# Patient Record
Sex: MALE | Race: WHITE | NOT HISPANIC OR LATINO | Employment: FULL TIME | URBAN - METROPOLITAN AREA
[De-identification: names, ages, dates, MRNs, and addresses within clinical notes are randomized per-mention and may not be internally consistent; named-entity substitution may affect disease eponyms.]

---

## 2017-02-15 ENCOUNTER — ALLSCRIPTS OFFICE VISIT (OUTPATIENT)
Dept: OTHER | Facility: OTHER | Age: 47
End: 2017-02-15

## 2017-02-15 DIAGNOSIS — M25.561 PAIN IN RIGHT KNEE: ICD-10-CM

## 2017-02-15 DIAGNOSIS — M25.562 PAIN IN LEFT KNEE: ICD-10-CM

## 2017-07-14 ENCOUNTER — TRANSCRIBE ORDERS (OUTPATIENT)
Dept: ADMINISTRATIVE | Facility: HOSPITAL | Age: 47
End: 2017-07-14

## 2017-07-14 ENCOUNTER — HOSPITAL ENCOUNTER (OUTPATIENT)
Dept: RADIOLOGY | Facility: HOSPITAL | Age: 47
Discharge: HOME | End: 2017-07-14
Attending: FAMILY MEDICINE
Payer: COMMERCIAL

## 2017-07-14 DIAGNOSIS — M25.561 ACUTE PAIN OF RIGHT KNEE: Primary | ICD-10-CM

## 2017-07-14 DIAGNOSIS — M25.562 ACUTE PAIN OF LEFT KNEE: ICD-10-CM

## 2017-07-14 PROCEDURE — 73562 X-RAY EXAM OF KNEE 3: CPT

## 2017-07-18 ENCOUNTER — GENERIC CONVERSION - ENCOUNTER (OUTPATIENT)
Dept: OTHER | Facility: OTHER | Age: 47
End: 2017-07-18

## 2017-08-16 ENCOUNTER — GENERIC CONVERSION - ENCOUNTER (OUTPATIENT)
Dept: OTHER | Facility: OTHER | Age: 47
End: 2017-08-16

## 2017-08-16 LAB
BASOPHILS # BLD AUTO: 0 X10E3/UL (ref 0–0.2)
BASOPHILS # BLD AUTO: 1 %
CHOLEST SERPL-MCNC: 177 MG/DL (ref 100–199)
CHOLEST/HDLC SERPL: 3.4 RATIO UNITS (ref 0–5)
DEPRECATED RDW RBC AUTO: 13.9 % (ref 12.3–15.4)
EOSINOPHIL # BLD AUTO: 0.4 X10E3/UL (ref 0–0.4)
EOSINOPHIL # BLD AUTO: 4 %
HCT VFR BLD AUTO: 42.2 % (ref 37.5–51)
HDLC SERPL-MCNC: 52 MG/DL
HGB BLD-MCNC: 14.4 G/DL (ref 12.6–17.7)
IMM.GRANULOCYTES (CD4/8) (HISTORICAL): 0 %
IMM.GRANULOCYTES (CD4/8) (HISTORICAL): 0 X10E3/UL (ref 0–0.1)
LDLC SERPL CALC-MCNC: 102 MG/DL (ref 0–99)
LYMPHOCYTES # BLD AUTO: 1.9 X10E3/UL (ref 0.7–3.1)
LYMPHOCYTES # BLD AUTO: 21 %
MCH RBC QN AUTO: 30.7 PG (ref 26.6–33)
MCHC RBC AUTO-ENTMCNC: 34.1 G/DL (ref 31.5–35.7)
MCV RBC AUTO: 90 FL (ref 79–97)
MONOCYTES # BLD AUTO: 0.7 X10E3/UL (ref 0.1–0.9)
MONOCYTES (HISTORICAL): 7 %
NEUTROPHILS # BLD AUTO: 5.9 X10E3/UL (ref 1.4–7)
NEUTROPHILS # BLD AUTO: 67 %
PLATELET # BLD AUTO: 280 X10E3/UL (ref 150–379)
RBC (HISTORICAL): 4.69 X10E6/UL (ref 4.14–5.8)
TRIGL SERPL-MCNC: 117 MG/DL (ref 0–149)
VLDLC SERPL CALC-MCNC: 23 MG/DL (ref 5–40)
WBC # BLD AUTO: 8.8 X10E3/UL (ref 3.4–10.8)

## 2017-08-17 ENCOUNTER — GENERIC CONVERSION - ENCOUNTER (OUTPATIENT)
Dept: OTHER | Facility: OTHER | Age: 47
End: 2017-08-17

## 2017-08-17 LAB
A/G RATIO (HISTORICAL): 1.5 (ref 1.2–2.2)
ALBUMIN SERPL BCP-MCNC: 4.6 G/DL (ref 3.5–5.5)
ALP SERPL-CCNC: 80 IU/L (ref 39–117)
ALT SERPL W P-5'-P-CCNC: 37 IU/L (ref 0–44)
AST SERPL W P-5'-P-CCNC: 24 IU/L (ref 0–40)
BILIRUB SERPL-MCNC: 0.5 MG/DL (ref 0–1.2)
BUN SERPL-MCNC: 14 MG/DL (ref 6–24)
BUN/CREA RATIO (HISTORICAL): 15 (ref 9–20)
CALCIUM SERPL-MCNC: 9.7 MG/DL (ref 8.7–10.2)
CHLORIDE SERPL-SCNC: 100 MMOL/L (ref 96–106)
CO2 SERPL-SCNC: 21 MMOL/L (ref 18–29)
CREAT SERPL-MCNC: 0.91 MG/DL (ref 0.76–1.27)
EGFR AFRICAN AMERICAN (HISTORICAL): 116 ML/MIN/1.73
EGFR-AMERICAN CALC (HISTORICAL): 100 ML/MIN/1.73
GLUCOSE SERPL-MCNC: 112 MG/DL (ref 65–99)
INTERPRETATION (HISTORICAL): NORMAL
POTASSIUM SERPL-SCNC: 4.3 MMOL/L (ref 3.5–5.2)
PROSTATE SPECIFIC ANTIGEN (HISTORICAL): <0.1 NG/ML (ref 0–4)
SODIUM SERPL-SCNC: 139 MMOL/L (ref 134–144)
TOT. GLOBULIN, SERUM (HISTORICAL): 3 G/DL (ref 1.5–4.5)
TOTAL PROTEIN (HISTORICAL): 7.6 G/DL (ref 6–8.5)

## 2017-08-24 ENCOUNTER — ALLSCRIPTS OFFICE VISIT (OUTPATIENT)
Dept: OTHER | Facility: OTHER | Age: 47
End: 2017-08-24

## 2017-12-01 ENCOUNTER — ALLSCRIPTS OFFICE VISIT (OUTPATIENT)
Dept: OTHER | Facility: OTHER | Age: 47
End: 2017-12-01

## 2017-12-04 LAB
A/G RATIO (HISTORICAL): 1.6 (ref 1.2–2.2)
ALBUMIN SERPL BCP-MCNC: 4.4 G/DL (ref 3.5–5.5)
ALP SERPL-CCNC: 81 IU/L (ref 39–117)
ALT SERPL W P-5'-P-CCNC: 88 IU/L (ref 0–44)
AST SERPL W P-5'-P-CCNC: 58 IU/L (ref 0–40)
BASOPHILS # BLD AUTO: 0 X10E3/UL (ref 0–0.2)
BASOPHILS # BLD AUTO: 1 %
BILIRUB SERPL-MCNC: 0.3 MG/DL (ref 0–1.2)
BUN SERPL-MCNC: 17 MG/DL (ref 6–24)
BUN/CREA RATIO (HISTORICAL): 21 (ref 9–20)
CALCIUM SERPL-MCNC: 9.3 MG/DL (ref 8.7–10.2)
CHLORIDE SERPL-SCNC: 103 MMOL/L (ref 96–106)
CO2 SERPL-SCNC: 21 MMOL/L (ref 18–29)
CREAT SERPL-MCNC: 0.82 MG/DL (ref 0.76–1.27)
DEPRECATED RDW RBC AUTO: 14 % (ref 12.3–15.4)
EGFR AFRICAN AMERICAN (HISTORICAL): 122 ML/MIN/1.73
EGFR-AMERICAN CALC (HISTORICAL): 105 ML/MIN/1.73
EOSINOPHIL # BLD AUTO: 0.2 X10E3/UL (ref 0–0.4)
EOSINOPHIL # BLD AUTO: 2 %
GLUCOSE SERPL-MCNC: 98 MG/DL (ref 65–99)
HCT VFR BLD AUTO: 39.4 % (ref 37.5–51)
HGB BLD-MCNC: 13.4 G/DL (ref 13–17.7)
IMM.GRANULOCYTES (CD4/8) (HISTORICAL): 0.1 X10E3/UL (ref 0–0.1)
IMM.GRANULOCYTES (CD4/8) (HISTORICAL): 1 %
LYMPHOCYTES # BLD AUTO: 1.5 X10E3/UL (ref 0.7–3.1)
LYMPHOCYTES # BLD AUTO: 20 %
MCH RBC QN AUTO: 30.3 PG (ref 26.6–33)
MCHC RBC AUTO-ENTMCNC: 34 G/DL (ref 31.5–35.7)
MCV RBC AUTO: 89 FL (ref 79–97)
MONOCYTES # BLD AUTO: 0.5 X10E3/UL (ref 0.1–0.9)
MONOCYTES (HISTORICAL): 7 %
NEUTROPHILS # BLD AUTO: 5.1 X10E3/UL (ref 1.4–7)
NEUTROPHILS # BLD AUTO: 69 %
PLATELET # BLD AUTO: 276 X10E3/UL (ref 150–379)
POTASSIUM SERPL-SCNC: 4.2 MMOL/L (ref 3.5–5.2)
RBC (HISTORICAL): 4.42 X10E6/UL (ref 4.14–5.8)
SODIUM SERPL-SCNC: 141 MMOL/L (ref 134–144)
TOT. GLOBULIN, SERUM (HISTORICAL): 2.8 G/DL (ref 1.5–4.5)
TOTAL PROTEIN (HISTORICAL): 7.2 G/DL (ref 6–8.5)
WBC # BLD AUTO: 7.4 X10E3/UL (ref 3.4–10.8)

## 2017-12-05 ENCOUNTER — GENERIC CONVERSION - ENCOUNTER (OUTPATIENT)
Dept: OTHER | Facility: OTHER | Age: 47
End: 2017-12-05

## 2017-12-05 LAB — TSH SERPL DL<=0.05 MIU/L-ACNC: 1 UIU/ML (ref 0.45–4.5)

## 2017-12-05 NOTE — PROGRESS NOTES
Assessment    1  Syncope (780 2) (R55)   2  Benign essential hypertension (401 1) (I10)   3  Adenocarcinoma of prostate (185) (C61)   4  Abnormal glucose (790 29) (R73 09)   5  Leukocytosis (288 60) (D72 829)   6  Dehydration (276 51) (E86 0)   7  BMI 50 0-59 9, adult (V85 43) (Z68 43)   8  Obesity, morbid, BMI 50 or higher (278 01) (E66 01)   9  Never a smoker    Plan  Abnormal glucose, Adenocarcinoma of prostate, Benign essential hypertension, BMI  50 0-59 9, adult, Dehydration, Leukocytosis, SocHx: Never a smoker, Obesity, morbid,  BMI 50 or higher, Syncope    · (1) CBC/PLT/DIFF; Status:Active; Requested for:01Dec2017;    · (1) COMPREHENSIVE METABOLIC PANEL; Status:Active; Requested for:01Dec2017;    · (1) TSH; Status:Active; Requested for:01Dec2017;   Benign essential hypertension    · From  Bystolic 10 MG Oral Tablet take one tablet by mouth every day To  Bystolic 10 MG Oral Tablet TAKE 1/2 TABLET BY MOUTH EVERY DAY  Obesity, morbid, BMI 50 or higher    · Avoid alcoholic beverages ; Status:Complete;   Done: 27OYS7673   · Begin a limited exercise program ; Status:Complete;   Done: 48XXQ1259   · Begin or continue regular aerobic exercise  Gradually work up to at least 3 sessions of  30 minutes of exercise a week ; Status:Complete;   Done: 61TJL0308   · Eat a low fat and low cholesterol diet ; Status:Complete;   Done: 49NSL3548   · Keep a diary of when and what you eat ; Status:Complete;   Done: 26MZO0946   · Shared Decision Making Aid given; Status:Complete;   Done: 43BXV7881   · Some eating tips that can help you lose weight ; Status:Complete;   Done: 63LPQ2432   · Stretch and warm up your muscles during the first 10 minutes , then cool down your  muscles for the last 10 minutes of exercise ; Status:Complete;   Done: 51LZP4684   · There are many exercise options for seniors ; Status:Complete;   Done: 74AHD9981   · We encourage all of our patients to exercise regularly    30 minutes of exercise or physical  activity five or more days a week is recommended for children and adults ;  Status:Complete;   Done: 39AVC1145   · We recommend that you bring your body mass index down to 26 ; Status:Complete;    Done: 89GEL4477   · We recommend that you change your eating habits slowly ; Status:Complete;   Done:  41JGZ0436   · We recommend you modify your diet to achieve and maintain a healthy weight  Being  overweight may increase your risk for developing health problems such as diabetes,  heart disease, and cancer  Avoid high fat foods and eat a balanced diet rich  in fruits and vegetables  The combination of a reduced-calorie diet and increased  physical activity is recommended    Please let us know if you would like to  learn more about your nutrition and calorie needs, and additional options including  weight loss programs that can help you achieve your goals ; Status:Complete;   Done:  48HLX3726   · We want you to follow the Therapeutic Lifestyle Changes (TLC) diet ; Status:Complete;    Done: 63TXZ4202   · Call [phone] if: You are considering suicide ; Status:Complete;   Done:  43EEU7944   · Call [phone] if: You are having difficulty sleeping (insomnia) ; Status:Complete;    Done: 90DUX0408   · Call [phone] if: You are urinating too frequently ; Status:Complete;   Done:  68PKJ1285   · Call [phone] if: You feel thirsty most of the time ; Status:Complete;   Done:  62CXX9067   · Call [phone] if: You feel your heart is beating very fast or skipping beats ;  Status:Complete;   Done: 41AHI8434   · Call [phone] if: You have feelings of extreme sadness and feelings of  hopelessness ; Status:Complete;   Done: 75GMC9230   · Call [phone] if: You have pain in your abdomen ; Status:Complete;   Done:  67QDH2738   · Call [phone] if: You have symptoms of sleep apnea ; Status:Complete;   Done:  49YAN3610   · Call 911 if: You have sudden or severe chest pain with shortness of breath, rapid  breathing, or cough ; Status:Complete;   Done: 29EDQ0279   · Seek Immediate Medical Attention if: You experience a new kind of chest pain (angina) or  pressure ; Status:Complete;   Done: 15SDC5643    Discussion/Summary    Pt advised not to drive his car with exhaust fumes    pt has an appt with Dr Kimberlee Branch coming up for his prostate    pt looks good to me today, will [facility] but that tends to be hard  will get labs to follow up abnormalitis noted by pat and will follow    will cut bact the bystolic in half  Chief Complaint  pt present for hospital follow up for passing out and vomiting  ac/cma      History of Present Illness  Pt states he is here for a hospital follow up    pt states he was at work went to the bathroom twice first time he went it was solid second time it was loose  had stomach cramps, then he started feeling funny and he went back to his office and he passed out  pt states he woke up and the emt's got there and he felt sick and he vomited two liters of fluid - as per pt  Pt states he tyhinks he may have had food poisening as he ate sketchy things   Pt told the emt to go away - an hr later he felt sick vomited again and he went to hospital - this was two days ago  this was seen in the ed and let go that night    pt states his boss is an MD and he witnessed the whole thing  pt states he has witched to a ketogenic diet    Pt did not loose bowel control    pt does not have his discharge paperwork  i do not have an ed report    pt states he had an ekg a chest x ray - was apparentl;y ok  states his BP was ok  was told his WBC count was slightly high  pt states he was told he was dehydrated  pt drinks three liters of water a day    pt states he finds that he fasts and he wont eat from the am till dinner    pt states he has had exhaust fumes come in the car curt a month or so    pt states he does take his colchicine on occasion for gout but does not get bad attacks      Review of Systems    Constitutional: No fever or chills, feels well, no tiredness, no recent weight gain or weight loss  Eyes: No complaints of eye pain, no red eyes, no discharge from eyes, no itchy eyes  ENT: no complaints of earache, no hearing loss, no nosebleeds, no nasal discharge, no sore throat, no hoarseness  Cardiovascular: No complaints of slow heart rate, no fast heart rate, no chest pain, no palpitations, no leg claudication, no lower extremity  Respiratory: No complaints of shortness of breath, no wheezing, no cough, no SOB on exertion, no orthopnea or PND  Gastrointestinal: No complaints of abdominal pain, no constipation, no nausea or vomiting, no diarrhea or bloody stools  Genitourinary: No complaints of dysuria, no incontinence, no hesitancy, no nocturia, no genital lesion, no testicular pain  Musculoskeletal: No complaints of arthralgia, no myalgias, no joint swelling or stiffness, no limb pain or swelling  Integumentary: No complaints of skin rash or skin lesions, no itching, no skin wound, no dry skin  Neurological: No compliants of headache, no confusion, no convulsions, no numbness or tingling, no dizziness or fainting, no limb weakness, no difficulty walking  Psychiatric: Is not suicidal, no sleep disturbances, no anxiety or depression, no change in personality, no emotional problems  Endocrine: No complaints of proptosis, no hot flashes, no muscle weakness, no erectile dysfunction, no deepening of the voice, no feelings of weakness  Active Problems    1  Abnormal glucose (790 29) (R73 09)   2  Adenocarcinoma of prostate (185) (C61)   3  Benign essential hypertension (401 1) (I10)   4  Depression screening (V79 0) (Z13 89)   5  Encounter for prostate cancer screening (V76 44) (Z12 5)   6  Exposure to influenza (V01 79) (Z20 828)   7  Gout (274 9) (M10 9)   8  Irregular sleep-wake rhythm, 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)   9   Obstructive sleep apnea (327 23) (G47 33)   10  Osteoarthritis of both knees (715 96) (M17 0)   11  Prostate cancer (185) (C61)   12  Varicose Veins Of Lower Extremities (454 9)   13  Venous insufficiency (chronic) (peripheral) (459 81) (I87 2)    Past Medical History    1  History of Abnormal electrocardiogram (794 31) (R94 31)   2  History of Acute upper respiratory infection (465 9) (J06 9)   3  History of Arthropathy (716 90) (M12 9)   4  History of BMI 50 0-59 9, adult (V85 43) (Z68 43)   5  History of BMI 60 0-69 9, adult (V85 44) (Z68 44)   6  History of Cervicalgia (723 1) (M54 2)   7  History of Difficulty breathing (786 09) (R06 89)   8  History of Elbow pain, unspecified laterality (719 42) (M25 529)   9  History of Gout (274 9) (M10 9)   10  History of acute bronchitis (V12 69) (Z87 09)   11  History of hemorrhoids (V13 89) (Z87 19)   12  History of hypertension (V12 59) (Z86 79)   13  History of low back pain (V13 59) (Z87 39)   14  History of sleep apnea (V13 89) (Z86 69)   15  History of tachycardia (V13 89) (Z87 898)   16  History of Impaired fasting glucose (790 21) (R73 01)   17  History of Joint pain, knee (719 46) (M25 569)   18  History of Pneumonia (V12 61)   19  History of Varicose Veins Of Lower Extremities (454 9)    The active problems and past medical history were reviewed and updated today  Surgical History    1  History of Prostatectomy Radical   2  History of Shoulder Surgery    The surgical history was reviewed and updated today  Family History  Mother    1  No pertinent family history  Family History    2  Family history of Coronary Artery Disease (V17 49)   3  Family history of Esophageal Cancer (V16 0)   4  Family history of Heart Disease (V17 49)    The family history was reviewed and updated today  Social History    · Never a smoker  The social history was reviewed and updated today  Current Meds   1  Bystolic 10 MG Oral Tablet; take one tablet by mouth every day;    Therapy: 51WOW0852 to (Last Rx:09Oct2017)  Requested for: 08SYY0759 Ordered   2  Colcrys 0 6 MG Oral Tablet; TAKE TWO TABLETS BY MOUTH FOR 1 DOSE, THEN TAKE 1   TABLET 1 HOUR LATERAS DIRECTED; Therapy: 23POI7326 to (Last Rx:25Oct2017)  Requested for: 25Oct2017 Ordered   3  Olmesartan Medoxomil 40 MG Oral Tablet; take one tablet by mouth every day; Therapy: 71MCS7014 to (77 873 135)  Requested for: 89Usm9555; Last   Rx:05Apr2017 Ordered    The medication list was reviewed and updated today  Allergies    1  No Known Drug Allergies    Vitals  Vital Signs    Recorded: 91NCY3901 08:53AM Recorded: 22ONJ2025 08:39AM   Temperature  95 6 F   Heart Rate  88   Respiration  20   Systolic  228   Diastolic  74   Height  6 ft    Weight 417 lb     BMI Calculated 56 56    BSA Calculated 2 91    Weight Unobtainable  Yes     Physical Exam    Constitutional   General appearance: No acute distress, well appearing and well nourished  Eyes   Conjunctiva and lids: No swelling, erythema, or discharge  Pupils and irises: Equal, round and reactive to light  Ears, Nose, Mouth, and Throat   External inspection of ears and nose: Normal     Otoscopic examination: Tympanic membrance translucent with normal light reflex  Canals patent without erythema  Oropharynx: Normal with no erythema, edema, exudate or lesions  Pulmonary   Auscultation of lungs: Clear to auscultation, equal breath sounds bilaterally, no wheezes, no rales, no rhonci  Cardiovascular   Auscultation of heart: Normal rate and rhythm, normal S1 and S2, without murmurs  Abdomen   Abdomen: Non-tender, no masses  Lymphatic   Palpation of lymph nodes in neck: No lymphadenopathy  Musculoskeletal   Gait and station: Normal     Digits and nails: Normal without clubbing or cyanosis  Skin   Skin and subcutaneous tissue: Normal without rashes or lesions  Neurologic   Cranial nerves: Cranial nerves 2-12 intact      Psychiatric   Orientation to person, place and time: Normal          Signatures   Electronically signed by : Anjel Walker DO; Dec  1 2017  9:10AM EST                       (Author)

## 2018-01-13 VITALS
BODY MASS INDEX: 42.66 KG/M2 | DIASTOLIC BLOOD PRESSURE: 60 MMHG | RESPIRATION RATE: 20 BRPM | WEIGHT: 315 LBS | HEIGHT: 72 IN | SYSTOLIC BLOOD PRESSURE: 126 MMHG | HEART RATE: 98 BPM | TEMPERATURE: 96.3 F

## 2018-01-13 VITALS
SYSTOLIC BLOOD PRESSURE: 140 MMHG | BODY MASS INDEX: 42.66 KG/M2 | WEIGHT: 315 LBS | DIASTOLIC BLOOD PRESSURE: 78 MMHG | HEART RATE: 88 BPM | RESPIRATION RATE: 20 BRPM | TEMPERATURE: 96.5 F | HEIGHT: 72 IN

## 2018-01-13 NOTE — RESULT NOTES
Discussion/Summary   will discuss labs at follow up appt     Verified Results  (1) CBC/PLT/DIFF 68YSO4819 12:23PM Veryl Deacon     Test Name Result Flag Reference   WBC 8 8 x10E3/uL  3 4-10 8   RBC 4 69 x10E6/uL  4 14-5 80   Hemoglobin 14 4 g/dL  12 6-17 7   Hematocrit 42 2 %  37 5-51 0   MCV 90 fL  79-97   MCH 30 7 pg  26 6-33 0   MCHC 34 1 g/dL  31 5-35 7   RDW 13 9 %  12 3-15 4   Platelets 161 M16N0/KK  150-379   Neutrophils 67 %     Lymphs 21 %     Monocytes 7 %     Eos 4 %     Basos 1 %     Neutrophils (Absolute) 5 9 x10E3/uL  1 4-7 0   Lymphs (Absolute) 1 9 x10E3/uL  0 7-3 1   Monocytes(Absolute) 0 7 x10E3/uL  0 1-0 9   Eos (Absolute) 0 4 x10E3/uL  0 0-0 4   Baso (Absolute) 0 0 x10E3/uL  0 0-0 2   Immature Granulocytes 0 %     Immature Grans (Abs) 0 0 x10E3/uL  0 0-0 1     (1) COMPREHENSIVE METABOLIC PANEL 75MJS6443 80:84TH Veryl Deacon     Test Name Result Flag Reference   Glucose, Serum 112 mg/dL H 65-99   BUN 14 mg/dL  6-24   Creatinine, Serum 0 91 mg/dL  0 76-1 27   BUN/Creatinine Ratio 15  9-20   Sodium, Serum 139 mmol/L  134-144   Potassium, Serum 4 3 mmol/L  3 5-5 2   Chloride, Serum 100 mmol/L     Carbon Dioxide, Total 21 mmol/L  18-29   Calcium, Serum 9 7 mg/dL  8 7-10 2   Protein, Total, Serum 7 6 g/dL  6 0-8 5   Albumin, Serum 4 6 g/dL  3 5-5 5   Globulin, Total 3 0 g/dL  1 5-4 5   A/G Ratio 1 5  1 2-2 2   Bilirubin, Total 0 5 mg/dL  0 0-1 2   Alkaline Phosphatase, S 80 IU/L     AST (SGOT) 24 IU/L  0-40   ALT (SGPT) 37 IU/L  0-44   eGFR If NonAfricn Am 100 mL/min/1 73  >59   eGFR If Africn Am 116 mL/min/1 73  >59     (1) LIPID PANEL, FASTING 36Tgm8416 12:23PM Veryl Deacon     Test Name Result Flag Reference   Cholesterol, Total 177 mg/dL  100-199   Triglycerides 117 mg/dL  0-149   HDL Cholesterol 52 mg/dL  >39   VLDL Cholesterol Christian 23 mg/dL  5-40   LDL Cholesterol Calc 102 mg/dL H 0-99   T  Chol/HDL Ratio 3 4 ratio units  0 0-5 0   T   Chol/HDL Ratio Men  Women                                               1/2 Avg  Risk  3 4    3 3                                                   Avg Risk  5 0    4 4                                                2X Avg  Risk  9 6    7 1                                                3X Avg  Risk 23 4   11 0     (1) PSA (SCREEN) (Dx V76 44 Screen for Prostate Cancer) 63VIN4627 12:23PM Ramon Lam     Test Name Result Flag Reference   Prostate Specific Ag, Serum <0 1 ng/mL  0 0-4 0   Roche ECLIA methodology  According to the American U[employer] Association, Serum PSA should  decrease and remain at undetectable levels after radical  prostatectomy  The AUA defines biochemical recurrence as an initial  PSA value 0 2 ng/mL or greater followed by a subsequent confirmatory  PSA value 0 2 ng/mL or greater  Values obtained with different assay methods or kits cannot be used  interchangeably  Results cannot be interpreted as absolute evidence  of the presence or absence of malignant disease  [facility]) Cardiovascular Risk Assessment 41Cmi9019 12:23PM Juan Lopez courtesy copy of this report has been sent to  Binu Anaya MD      Test Name Result Flag Reference   Interpretation Note     Supplement report is available  PDF Image

## 2018-01-16 NOTE — RESULT NOTES
Discussion/Summary   x rays show B/L osteoarthritis  If pain continues eval by ortho would be useful     Verified Results  * XR KNEE 3 VW LEFT NON INJURY 39IUT1421 12:00AM Leim Lyndsey     Test Name Result Flag Reference   XR KNEE 3 VW LEFT (Report)     LEFT KNEE     INDICATION: Left knee pain  COMPARISON: None     VIEWS: 3     IMAGES: 3     FINDINGS:     There is no acute fracture or dislocation  There is a small joint effusion  There is moderate osteoarthritis of the medial compartment with joint space loss and osteophytosis  Spurring of the patella is also noted  No lytic or blastic lesions are seen  Soft tissues are unremarkable  IMPRESSION:       1  No acute osseous abnormality  2  Degenerative changes as described  Workstation performed: [id number]     Signed by:   Pauly Monroe MD   7/18/17     * XR KNEE 3 VW RIGHT NON INJURY 24YKN4317 12:00AM Leim Lyndsey     Test Name Result Flag Reference   XR KNEE 3 VW RIGHT (Report)     RIGHT KNEE     INDICATION: Right knee pain  COMPARISON: None     VIEWS: 3     IMAGES: 3     FINDINGS:     There is no acute fracture or dislocation  There is no joint effusion  Tricompartmental osteoarthritis is most severe in the medial compartment where there is joint space loss and mild osteophytosis  No lytic or blastic lesions are seen  Soft tissues are unremarkable  IMPRESSION:       1  No acute osseous abnormality  2  Degenerative changes as described         Workstation performed: [id number]     Signed by:   Pauly Monroe MD   7/18/17

## 2018-01-19 LAB
A/G RATIO (HISTORICAL): 1.5 (ref 1.2–2.2)
ALBUMIN SERPL BCP-MCNC: 4.5 G/DL (ref 3.5–5.5)
ALP SERPL-CCNC: 81 IU/L (ref 39–117)
ALT SERPL W P-5'-P-CCNC: 34 IU/L (ref 0–44)
AST SERPL W P-5'-P-CCNC: 23 IU/L (ref 0–40)
BILIRUB SERPL-MCNC: 0.3 MG/DL (ref 0–1.2)
BUN SERPL-MCNC: 32 MG/DL (ref 6–24)
BUN/CREA RATIO (HISTORICAL): 32 (ref 9–20)
CALCIUM SERPL-MCNC: 9.5 MG/DL (ref 8.7–10.2)
CHLORIDE SERPL-SCNC: 104 MMOL/L (ref 96–106)
CO2 SERPL-SCNC: 19 MMOL/L (ref 18–29)
CREAT SERPL-MCNC: 1 MG/DL (ref 0.76–1.27)
EGFR AFRICAN AMERICAN (HISTORICAL): 103 ML/MIN/1.73
EGFR-AMERICAN CALC (HISTORICAL): 89 ML/MIN/1.73
GLUCOSE SERPL-MCNC: 128 MG/DL (ref 65–99)
POTASSIUM SERPL-SCNC: 4.6 MMOL/L (ref 3.5–5.2)
SODIUM SERPL-SCNC: 141 MMOL/L (ref 134–144)
TOT. GLOBULIN, SERUM (HISTORICAL): 3.1 G/DL (ref 1.5–4.5)
TOTAL PROTEIN (HISTORICAL): 7.6 G/DL (ref 6–8.5)

## 2018-01-22 ENCOUNTER — GENERIC CONVERSION - ENCOUNTER (OUTPATIENT)
Dept: OTHER | Facility: OTHER | Age: 48
End: 2018-01-22

## 2018-01-23 VITALS
HEIGHT: 72 IN | BODY MASS INDEX: 42.66 KG/M2 | DIASTOLIC BLOOD PRESSURE: 74 MMHG | RESPIRATION RATE: 20 BRPM | SYSTOLIC BLOOD PRESSURE: 124 MMHG | WEIGHT: 315 LBS | HEART RATE: 88 BPM | TEMPERATURE: 95.6 F

## 2018-01-23 NOTE — RESULT NOTES
Discussion/Summary   some elevation in liver function studies should repeat in a month     Verified Results  (1) CBC/PLT/DIFF 60LLN4741 10:12AM [facility]     Test Name Result Flag Reference   WBC 7 4 x10E3/uL  3 4-10 8   RBC 4 42 x10E6/uL  4 14-5 80   Hemoglobin 13 4 g/dL  13 0-17 7   **Please note reference interval change**   Hematocrit 39 4 %  37 5-51 0   MCV 89 fL  79-97   MCH 30 3 pg  26 6-33 0   MCHC 34 0 g/dL  31 5-35 7   RDW 14 0 %  12 3-15 4   Platelets 725 G21T1/AC  150-379   Neutrophils 69 %  Not Estab  Lymphs 20 %  Not Estab  Monocytes 7 %  Not Estab  Eos 2 %  Not Estab  Basos 1 %  Not Estab  Neutrophils (Absolute) 5 1 x10E3/uL  1 4-7 0   Lymphs (Absolute) 1 5 x10E3/uL  0 7-3 1   Monocytes(Absolute) 0 5 x10E3/uL  0 1-0 9   Eos (Absolute) 0 2 x10E3/uL  0 0-0 4   Baso (Absolute) 0 0 x10E3/uL  0 0-0 2   Immature Granulocytes 1 %  Not Estab  Immature Grans (Abs) 0 1 x10E3/uL  0 0-0 1     (1) COMPREHENSIVE METABOLIC PANEL 30UWY0101 79:22LB [facility]     Test Name Result Flag Reference   Glucose, Serum 98 mg/dL  65-99   BUN 17 mg/dL  6-24   Creatinine, Serum 0 82 mg/dL  0 76-1 27   BUN/Creatinine Ratio 21 H 9-20   Sodium, Serum 141 mmol/L  134-144   Potassium, Serum 4 2 mmol/L  3 5-5 2   Chloride, Serum 103 mmol/L     Carbon Dioxide, Total 21 mmol/L  18-29   Calcium, Serum 9 3 mg/dL  8 7-10 2   Protein, Total, Serum 7 2 g/dL  6 0-8 5   Albumin, Serum 4 4 g/dL  3 5-5 5   Globulin, Total 2 8 g/dL  1 5-4 5   A/G Ratio 1 6  1 2-2 2   Bilirubin, Total 0 3 mg/dL  0 0-1 2   Alkaline Phosphatase, S 81 IU/L     AST (SGOT) 58 IU/L H 0-40   ALT (SGPT) 88 IU/L H 0-44   eGFR If NonAfricn Am 105 mL/min/1 73  >59   eGFR If Africn Am 122 mL/min/1 73  >59     (1) TSH [id number] 10:12AM Bela Colunga     Test Name Result Flag Reference   TSH 0 998 uIU/mL  0 450-4 500       Plan  Elevated liver enzymes    · (1) COMPREHENSIVE METABOLIC PANEL; Status:Active;  Requested for:75Txu5424;

## 2018-01-24 NOTE — RESULT NOTES
Discussion/Summary   limit carbs increase sugars     Verified Results  (1) COMPREHENSIVE METABOLIC PANEL 15JGI2247 20:92JW Joelle Stevesnon     Test Name Result Flag Reference   Glucose, Serum 128 mg/dL H 65-99   BUN 32 mg/dL H 6-24   Creatinine, Serum 1 00 mg/dL  0 76-1 27   BUN/Creatinine Ratio 32 H 9-20   Sodium, Serum 141 mmol/L  134-144   Potassium, Serum 4 6 mmol/L  3 5-5 2   Chloride, Serum 104 mmol/L     Carbon Dioxide, Total 19 mmol/L  18-29   Calcium, Serum 9 5 mg/dL  8 7-10 2   Protein, Total, Serum 7 6 g/dL  6 0-8 5   Albumin, Serum 4 5 g/dL  3 5-5 5   Globulin, Total 3 1 g/dL  1 5-4 5   A/G Ratio 1 5  1 2-2 2   Bilirubin, Total 0 3 mg/dL  0 0-1 2   Alkaline Phosphatase, S 81 IU/L     AST (SGOT) 23 IU/L  0-40   ALT (SGPT) 34 IU/L  0-44   eGFR If NonAfricn Am 89 mL/min/1 73  >59   eGFR If Africn Am 103 mL/min/1 73  >59

## 2018-02-01 ENCOUNTER — TELEPHONE (OUTPATIENT)
Dept: FAMILY MEDICINE CLINIC | Facility: CLINIC | Age: 48
End: 2018-02-01

## 2018-02-02 NOTE — TELEPHONE ENCOUNTER
Pt left a message on result hotline  He received his labs back with the results  (I had to look in [employer] for them) He has questions about the annotation  What you wrote is "limit carbs and increase sugars" pt wants to know if you meant increase salt because his sugars came back high  I put the lab work in your folder to review  Pt would like a call back   Jonnathan Cowan [place]

## 2018-04-09 DIAGNOSIS — I10 BENIGN ESSENTIAL HYPERTENSION: Primary | ICD-10-CM

## 2018-04-09 PROBLEM — M17.0 OSTEOARTHRITIS OF BOTH KNEES: Status: ACTIVE | Noted: 2017-08-24

## 2018-04-09 PROBLEM — R74.8 ELEVATED LIVER ENZYMES: Status: ACTIVE | Noted: 2017-12-05

## 2018-04-09 RX ORDER — OLMESARTAN MEDOXOMIL 40 MG/1
1 TABLET ORAL DAILY
COMMUNITY
Start: 2013-03-25 | End: 2018-04-09 | Stop reason: SDUPTHER

## 2018-04-09 RX ORDER — COLCHICINE 0.6 MG/1
0.6 TABLET ORAL AS NEEDED
COMMUNITY
Start: 2018-02-03 | End: 2018-12-11 | Stop reason: SDUPTHER

## 2018-04-09 RX ORDER — OLMESARTAN MEDOXOMIL 40 MG/1
40 TABLET ORAL DAILY
Qty: 90 TABLET | Refills: 1 | Status: SHIPPED | OUTPATIENT
Start: 2018-04-09 | End: 2018-10-02 | Stop reason: SDUPTHER

## 2018-04-09 RX ORDER — NEBIVOLOL 10 MG/1
5 TABLET ORAL DAILY
Qty: 90 TABLET | Refills: 1 | Status: SHIPPED | OUTPATIENT
Start: 2018-04-09 | End: 2018-09-21

## 2018-04-09 RX ORDER — NEBIVOLOL 10 MG/1
0.5 TABLET ORAL DAILY
COMMUNITY
Start: 2012-03-19 | End: 2018-04-09 | Stop reason: SDUPTHER

## 2018-07-10 DIAGNOSIS — M10.9 GOUT, UNSPECIFIED CAUSE, UNSPECIFIED CHRONICITY, UNSPECIFIED SITE: Primary | ICD-10-CM

## 2018-07-10 RX ORDER — ALLOPURINOL 100 MG/1
100 TABLET ORAL DAILY
Qty: 90 TABLET | Refills: 1 | Status: SHIPPED | OUTPATIENT
Start: 2018-07-10 | End: 2018-09-21

## 2018-09-16 ENCOUNTER — HOSPITAL ENCOUNTER (EMERGENCY)
Facility: HOSPITAL | Age: 48
Discharge: HOME | End: 2018-09-16
Attending: EMERGENCY MEDICINE | Admitting: EMERGENCY MEDICINE
Payer: COMMERCIAL

## 2018-09-16 ENCOUNTER — APPOINTMENT (EMERGENCY)
Dept: RADIOLOGY | Facility: HOSPITAL | Age: 48
End: 2018-09-16
Payer: COMMERCIAL

## 2018-09-16 VITALS
HEART RATE: 80 BPM | OXYGEN SATURATION: 98 % | BODY MASS INDEX: 42.66 KG/M2 | TEMPERATURE: 98.9 F | HEIGHT: 72 IN | SYSTOLIC BLOOD PRESSURE: 110 MMHG | WEIGHT: 315 LBS | DIASTOLIC BLOOD PRESSURE: 55 MMHG | RESPIRATION RATE: 20 BRPM

## 2018-09-16 DIAGNOSIS — N39.0 UTI (URINARY TRACT INFECTION): Primary | ICD-10-CM

## 2018-09-16 DIAGNOSIS — R31.9 HEMATURIA: ICD-10-CM

## 2018-09-16 LAB
ALBUMIN SERPL BCP-MCNC: 3.9 G/DL (ref 3.5–5)
ALP SERPL-CCNC: 79 U/L (ref 46–116)
ALT SERPL W P-5'-P-CCNC: 38 U/L (ref 12–78)
ANION GAP SERPL CALCULATED.3IONS-SCNC: 10 MMOL/L (ref 4–13)
AST SERPL W P-5'-P-CCNC: 11 U/L (ref 5–45)
BACTERIA UR QL AUTO: ABNORMAL /HPF
BASOPHILS # BLD AUTO: 0.03 THOUSANDS/ΜL (ref 0–0.1)
BASOPHILS NFR BLD AUTO: 0 % (ref 0–1)
BILIRUB DIRECT SERPL-MCNC: 0.2 MG/DL (ref 0–0.2)
BILIRUB SERPL-MCNC: 0.6 MG/DL (ref 0.2–1)
BILIRUB UR QL STRIP: NEGATIVE
BUN SERPL-MCNC: 19 MG/DL (ref 5–25)
CALCIUM SERPL-MCNC: 9.2 MG/DL (ref 8.3–10.1)
CHLORIDE SERPL-SCNC: 104 MMOL/L (ref 100–108)
CK SERPL-CCNC: 117 U/L (ref 39–308)
CLARITY UR: ABNORMAL
CO2 SERPL-SCNC: 24 MMOL/L (ref 21–32)
COLOR UR: ABNORMAL
CREAT SERPL-MCNC: 0.98 MG/DL (ref 0.6–1.3)
EOSINOPHIL # BLD AUTO: 0.02 THOUSAND/ΜL (ref 0–0.61)
EOSINOPHIL NFR BLD AUTO: 0 % (ref 0–6)
ERYTHROCYTE [DISTWIDTH] IN BLOOD BY AUTOMATED COUNT: 13.1 % (ref 11.6–15.1)
GFR SERPL CREATININE-BSD FRML MDRD: 91 ML/MIN/1.73SQ M
GLUCOSE SERPL-MCNC: 102 MG/DL (ref 65–140)
GLUCOSE UR STRIP-MCNC: NEGATIVE MG/DL
HCT VFR BLD AUTO: 42.5 % (ref 36.5–49.3)
HGB BLD-MCNC: 14.2 G/DL (ref 12–17)
HGB UR QL STRIP.AUTO: ABNORMAL
IMM GRANULOCYTES # BLD AUTO: 0.05 THOUSAND/UL (ref 0–0.2)
IMM GRANULOCYTES NFR BLD AUTO: 0 % (ref 0–2)
KETONES UR STRIP-MCNC: NEGATIVE MG/DL
LEUKOCYTE ESTERASE UR QL STRIP: ABNORMAL
LYMPHOCYTES # BLD AUTO: 0.79 THOUSANDS/ΜL (ref 0.6–4.47)
LYMPHOCYTES NFR BLD AUTO: 6 % (ref 14–44)
MCH RBC QN AUTO: 30.7 PG (ref 26.8–34.3)
MCHC RBC AUTO-ENTMCNC: 33.4 G/DL (ref 31.4–37.4)
MCV RBC AUTO: 92 FL (ref 82–98)
MONOCYTES # BLD AUTO: 0.58 THOUSAND/ΜL (ref 0.17–1.22)
MONOCYTES NFR BLD AUTO: 4 % (ref 4–12)
MUCOUS THREADS UR QL AUTO: ABNORMAL
NEUTROPHILS # BLD AUTO: 11.59 THOUSANDS/ΜL (ref 1.85–7.62)
NEUTS SEG NFR BLD AUTO: 90 % (ref 43–75)
NITRITE UR QL STRIP: NEGATIVE
NON-SQ EPI CELLS URNS QL MICRO: ABNORMAL /HPF
NRBC BLD AUTO-RTO: 0 /100 WBCS
PH UR STRIP.AUTO: 6 [PH] (ref 5–9)
PLATELET # BLD AUTO: 235 THOUSANDS/UL (ref 149–390)
PMV BLD AUTO: 10.6 FL (ref 8.9–12.7)
POTASSIUM SERPL-SCNC: 4.1 MMOL/L (ref 3.5–5.3)
PROT SERPL-MCNC: 7.7 G/DL (ref 6.4–8.2)
PROT UR STRIP-MCNC: ABNORMAL MG/DL
RBC # BLD AUTO: 4.62 MILLION/UL (ref 3.88–5.62)
RBC #/AREA URNS AUTO: ABNORMAL /HPF
SODIUM SERPL-SCNC: 138 MMOL/L (ref 136–145)
SP GR UR STRIP.AUTO: <=1.005 (ref 1–1.03)
UROBILINOGEN UR QL STRIP.AUTO: 0.2 E.U./DL
WBC # BLD AUTO: 13.06 THOUSAND/UL (ref 4.31–10.16)
WBC #/AREA URNS AUTO: ABNORMAL /HPF

## 2018-09-16 PROCEDURE — 80076 HEPATIC FUNCTION PANEL: CPT | Performed by: EMERGENCY MEDICINE

## 2018-09-16 PROCEDURE — 99284 EMERGENCY DEPT VISIT MOD MDM: CPT

## 2018-09-16 PROCEDURE — 80048 BASIC METABOLIC PNL TOTAL CA: CPT | Performed by: EMERGENCY MEDICINE

## 2018-09-16 PROCEDURE — 87040 BLOOD CULTURE FOR BACTERIA: CPT | Performed by: EMERGENCY MEDICINE

## 2018-09-16 PROCEDURE — 36415 COLL VENOUS BLD VENIPUNCTURE: CPT | Performed by: EMERGENCY MEDICINE

## 2018-09-16 PROCEDURE — 74176 CT ABD & PELVIS W/O CONTRAST: CPT

## 2018-09-16 PROCEDURE — 81001 URINALYSIS AUTO W/SCOPE: CPT | Performed by: EMERGENCY MEDICINE

## 2018-09-16 PROCEDURE — 96361 HYDRATE IV INFUSION ADD-ON: CPT

## 2018-09-16 PROCEDURE — 82550 ASSAY OF CK (CPK): CPT | Performed by: EMERGENCY MEDICINE

## 2018-09-16 PROCEDURE — 87086 URINE CULTURE/COLONY COUNT: CPT | Performed by: EMERGENCY MEDICINE

## 2018-09-16 PROCEDURE — 96365 THER/PROPH/DIAG IV INF INIT: CPT

## 2018-09-16 PROCEDURE — 85025 COMPLETE CBC W/AUTO DIFF WBC: CPT | Performed by: EMERGENCY MEDICINE

## 2018-09-16 RX ORDER — CEFADROXIL 500 MG/1
500 CAPSULE ORAL EVERY 12 HOURS SCHEDULED
Qty: 20 CAPSULE | Refills: 0 | Status: SHIPPED | OUTPATIENT
Start: 2018-09-16 | End: 2018-09-26

## 2018-09-16 RX ORDER — ONDANSETRON 4 MG/1
4 TABLET, FILM COATED ORAL EVERY 6 HOURS
Qty: 9 TABLET | Refills: 0 | Status: SHIPPED | OUTPATIENT
Start: 2018-09-16 | End: 2018-09-21 | Stop reason: ALTCHOICE

## 2018-09-16 RX ORDER — ACETAMINOPHEN 325 MG/1
650 TABLET ORAL ONCE
Status: COMPLETED | OUTPATIENT
Start: 2018-09-16 | End: 2018-09-16

## 2018-09-16 RX ADMIN — SODIUM CHLORIDE 1000 ML: 0.9 INJECTION, SOLUTION INTRAVENOUS at 19:18

## 2018-09-16 RX ADMIN — CEFTRIAXONE 1000 MG: 1 INJECTION, SOLUTION INTRAVENOUS at 21:01

## 2018-09-16 RX ADMIN — ACETAMINOPHEN 650 MG: 325 TABLET, FILM COATED ORAL at 19:17

## 2018-09-16 NOTE — ED PROVIDER NOTES
History  Chief Complaint   Patient presents with    Blood in Urine     pt c/o blood in urine x 1 1/2 days  fevers started today  last took Motrin 600 mg about 45 minutes PTA  51 y/o male presents with bloody urine, had left sided flank pain which woke him up at 3am in the morning  Patient was walking for an hour and half and exerting force and pushing fertilizer  went use the rest room and had bloody urine  Denies dysuria, urgency frequency, no nausea,vomiting  Currently no flank or abdominal pain  Not on anticoagulation  Denies any cough,congestion, no trauma  History of prostatectomy in the past          History provided by:  Patient   used: No        Prior to Admission Medications   Prescriptions Last Dose Informant Patient Reported? Taking? APPLE CIDER VINEGAR PO   Yes Yes   Sig: Take by mouth   allopurinol (ZYLOPRIM) 100 mg tablet   No Yes   Sig: Take 1 tablet (100 mg total) by mouth daily for 30 days   colchicine (COLCRYS) 0 6 mg tablet   Yes Yes   Si 6 mg as needed     nebivolol (BYSTOLIC) 10 mg tablet   No Yes   Sig: Take 0 5 tablets (5 mg total) by mouth daily   olmesartan (BENICAR) 40 mg tablet   No Yes   Sig: Take 1 tablet (40 mg total) by mouth daily      Facility-Administered Medications: None       Past Medical History:   Diagnosis Date    Fingertip amputation     Gout     Hypertension     Hypotension     resulting from Keto diet per pt    Obesity     Seizures ([address] )        Past Surgical History:   Procedure Laterality Date    PROSTATECTOMY      SHOULDER SURGERY Left        History reviewed  No pertinent family history  I have reviewed and agree with the history as documented  Social History   Substance Use Topics    Smoking status: Never Smoker    Smokeless tobacco: Never Used    Alcohol use No        Review of Systems   All other systems reviewed and are negative        Physical Exam  Physical Exam   Constitutional: He is oriented to person, place, and time  He appears well-developed and well-nourished  HENT:   Head: Normocephalic and atraumatic  Eyes: EOM are normal  Pupils are equal, round, and reactive to light  Neck: Normal range of motion  Neck supple  Cardiovascular: Normal rate and regular rhythm  Pulmonary/Chest: Effort normal and breath sounds normal    Abdominal: Soft  Bowel sounds are normal    Musculoskeletal: Normal range of motion  Neurological: He is alert and oriented to person, place, and time  Skin: Skin is warm and dry  Psychiatric: He has a normal mood and affect  Nursing note and vitals reviewed  Vital Signs  ED Triage Vitals [09/16/18 1841]   Temperature Pulse Respirations Blood Pressure SpO2   (!) 101 1 °F (38 4 °C) 94 18 129/66 96 %      Temp Source Heart Rate Source Patient Position - Orthostatic VS BP Location FiO2 (%)   Tympanic Monitor Sitting Right arm --      Pain Score       No Pain           Vitals:    09/16/18 2000 09/16/18 2015 09/16/18 2030 09/16/18 2146   BP:    110/55   Pulse: 86 78 78 80   Patient Position - Orthostatic VS:    Lying       Visual Acuity      ED Medications  Medications   sodium chloride 0 9 % bolus 1,000 mL (0 mL Intravenous Stopped 9/16/18 2147)   acetaminophen (TYLENOL) tablet 650 mg (650 mg Oral Given 9/16/18 1917)   cefTRIAXone (ROCEPHIN) IVPB (premix) 1,000 mg (0 mg Intravenous Stopped 9/16/18 2147)       Diagnostic Studies  Results Reviewed     Procedure Component Value Units Date/Time    Blood culture #1 [[id number]] Collected:  09/16/18 1909    Lab Status:  Preliminary result Specimen:  Blood from Arm, Left Updated:  09/19/18 2201     Blood Culture No Growth at 72 hrs  Blood culture #2 [[id number]] Collected:  09/16/18 1911    Lab Status:  Preliminary result Specimen:  Blood from Arm, Left Updated:  09/19/18 2201     Blood Culture No Growth at 72 hrs      Urine culture [[id number]] Collected:  09/16/18 1911    Lab Status:  Final result Specimen:  Urine from Urine, Clean Catch Updated:  09/18/18 1945     Urine Culture 80,000-89,000 cfu/ml     CBC and differential [[id number]]  (Abnormal) Collected:  09/16/18 1909    Lab Status:  Final result Specimen:  Blood from Arm, Left Updated:  09/16/18 1943     WBC 13 06 (H) Thousand/uL      RBC 4 62 Million/uL      Hemoglobin 14 2 g/dL      Hematocrit 42 5 %      MCV 92 fL      MCH 30 7 pg      MCHC 33 4 g/dL      RDW 13 1 %      MPV 10 6 fL      Platelets 022 Thousands/uL      nRBC 0 /100 WBCs      Neutrophils Relative 90 (H) %      Immat GRANS % 0 %      Lymphocytes Relative 6 (L) %      Monocytes Relative 4 %      Eosinophils Relative 0 %      Basophils Relative 0 %      Neutrophils Absolute 11 59 (H) Thousands/µL      Immature Grans Absolute 0 05 Thousand/uL      Lymphocytes Absolute 0 79 Thousands/µL      Monocytes Absolute 0 58 Thousand/µL      Eosinophils Absolute 0 02 Thousand/µL      Basophils Absolute 0 03 Thousands/µL     Narrative: This is an appended report  These results have been appended to a previously verified report  Basic metabolic panel [[id number]] Collected:  09/16/18 1909    Lab Status:  Final result Specimen:  Blood from Arm, Left Updated:  09/16/18 1942     Sodium 138 mmol/L      Potassium 4 1 mmol/L      Chloride 104 mmol/L      CO2 24 mmol/L      ANION GAP 10 mmol/L      BUN 19 mg/dL      Creatinine 0 98 mg/dL      Glucose 102 mg/dL      Calcium 9 2 mg/dL      eGFR 91 ml/min/1 73sq m     Narrative:         National Kidney Disease Education Program recommendations are as follows:  GFR calculation is accurate only with a steady state creatinine  Chronic Kidney disease less than 60 ml/min/1 73 sq  meters  Kidney failure less than 15 ml/min/1 73 sq  meters      Hepatic function panel [[id number]]  (Normal) Collected:  09/16/18 1909    Lab Status:  Final result Specimen:  Blood from Arm, Left Updated:  09/16/18 1942     Total Bilirubin 0 60 mg/dL      Bilirubin, Direct 0 20 mg/dL      Alkaline Phosphatase 79 U/L      AST 11 U/L      ALT 38 U/L      Total Protein 7 7 g/dL      Albumin 3 9 g/dL     CK Total with Reflex CKMB [[id number]]  (Normal) Collected:  09/16/18 1909    Lab Status:  Final result Specimen:  Blood from Arm, Left Updated:  09/16/18 1933     Total  U/L     Urine Microscopic [[id number]]  (Abnormal) Collected:  09/16/18 1910    Lab Status:  Final result Specimen:  Urine from Urine, Clean Catch Updated:  09/16/18 1927     RBC, UA 30-50 (A) /hpf      WBC, UA 10-20 (A) /hpf      Epithelial Cells Occasional /hpf      Bacteria, UA Moderate (A) /hpf      MUCOUS THREADS Occasional (A)    UA w Reflex to Microscopic [[id number]]  (Abnormal) Collected:  09/16/18 1910    Lab Status:  Final result Specimen:  Urine from Urine, Clean Catch Updated:  09/16/18 1920     Color, UA Red     Clarity, UA Cloudy     Specific Gravity, UA <=1 005     pH, UA 6 0     Leukocytes, UA Trace (A)     Nitrite, UA Negative     Protein, UA 30 (1+) (A) mg/dl      Glucose, UA Negative mg/dl      Ketones, UA Negative mg/dl      Urobilinogen, UA 0 2 E U /dl      Bilirubin, UA Negative     Blood, UA Large (A)                 CT renal stone study abdomen pelvis without contrast   Final Result by Tu Morin MD (09/16 2044)   1  No obstructive uropathy  2   Colonic diverticulosis without evidence of acute diverticulitis  Workstation performed: [id number]                    Procedures  Procedures       Phone Contacts  ED Phone Contact    ED Course                               MDM  Number of Diagnoses or Management Options  Hematuria:   UTI (urinary tract infection):   Diagnosis management comments: Patient evaluated with labs urinalysis and CT scan of the abdomen and pelvis  I reviewed the results and discussed with the patient  Patient will call Dr Nesha Dias, urologist to follow-up in his clinic  I gave him IV antibiotics and fluids in the ED    Patient verbalized understanding of instructions had no further questions at the time of discharge  Amount and/or Complexity of Data Reviewed  Clinical lab tests: ordered and reviewed  Tests in the radiology section of CPT®: ordered and reviewed  Tests in the medicine section of CPT®: ordered and reviewed    Patient Progress  Patient progress: stable    CritCare Time    Disposition  Final diagnoses:   UTI (urinary tract infection)   Hematuria     Time reflects when diagnosis was documented in both MDM as applicable and the Disposition within this note     Time User Action Codes Description Comment    9/16/2018  9:18 PM Rachel Thornton Add [N39 0] UTI (urinary tract infection)     9/16/2018  9:18 PM Rachel Thornton Add [R31 9] Hematuria       ED Disposition     ED Disposition Condition Comment    Discharge  [place] [facility] discharge to home/self care      Condition at discharge: Stable        Follow-up Information     Follow up With Specialties Details Why Contact Info Additional [address] [facility] DO [facility] Schedule an appointment as soon as possible for a visit  [address] [phone] [address]       Vesna Cunningham MD Urology Schedule an appointment as soon as possible for a visit  [address]  [address] [facility] [address] [facility],[address]       [address] [facility] Emergency Department Emergency Medicine  If symptoms worsen [address] [facility] ED, [facility] [place], [address]          Discharge Medication List as of 9/16/2018  9:19 PM      START taking these medications    Details   cefadroxil (DURICEF) 500 mg capsule Take 1 capsule (500 mg total) by mouth every 12 (twelve) hours for 10 days, Starting Sun 9/16/2018, Until Wed 9/26/2018, Print      ondansetron (ZOFRAN) 4 mg tablet Take 1 tablet (4 mg total) by mouth every 6 (six) hours for 3 days, Starting Sun 9/16/2018, Until Wed 9/19/2018, Print         CONTINUE these medications which have NOT CHANGED    Details   allopurinol (ZYLOPRIM) 100 mg tablet Take 1 tablet (100 mg total) by mouth daily for 30 days, Starting Tue 7/10/2018, Until Sun 9/16/2018, Normal      APPLE CIDER VINEGAR PO Take by mouth, Historical Med      colchicine (COLCRYS) 0 6 mg tablet 0 6 mg as needed  , Starting Sat 2/3/2018, Historical Med      nebivolol (BYSTOLIC) 10 mg tablet Take 0 5 tablets (5 mg total) by mouth daily, Starting Mon 4/9/2018, Normal      olmesartan (BENICAR) 40 mg tablet Take 1 tablet (40 mg total) by mouth daily, Starting Mon 4/9/2018, Normal           No discharge procedures on file      ED Provider  Electronically Signed by           Theron Wahl DO  09/20/18 1226

## 2018-09-17 NOTE — DISCHARGE INSTRUCTIONS
Acute Hematuria   WHAT YOU SHOULD KNOW:   Hematuria is blood in your urine from an injury or medical condition  Acute means the problem starts suddenly, worsens quickly, and lasts a short time  Your urine may be bright red to dark brown  Some common causes of hematuria are bladder infection, kidney stone, trauma to the kidneys or bladder, and some medications  Sometimes blood clots can block the urethra so that you cannot empty your bladder  AFTER YOU LEAVE:   Medicines:  Ask about these or other medicines you may need to treat the cause of your acute hematuria:  · Antibiotics: This medicine is given to fight or prevent an infection caused by bacteria  Always take your antibiotics exactly as ordered by your healthcare provider  Do not stop taking your medicine unless directed by your healthcare provider  Never save antibiotics or take leftover antibiotics that were given to you for another illness  · Take your medicine as directed  Call your healthcare provider if you think your medicine is not helping or if you have side effects  Tell him if you are allergic to any medicine  Keep a list of the medicines, vitamins, and herbs you take  Include the amounts, and when and why you take them  Bring the list or the pill bottles to follow-up visits  Carry your medicine list with you in case of an emergency  Increase the amount of fluid you drink:  Drink clear fluids to help flush the blood from your urinary tract  Follow instructions about how much fluid to drink  Follow up with your healthcare provider as directed: Your healthcare provider will tell you how often to come in for follow-up visits  He may refer you to a specialist, such as a urologist or nephrologist  The specialists may do tests or procedures to find more serious problems with your urinary system  Write down your questions so you remember to ask them during your visits     Contact your healthcare provider if:   · You have a fever that gets worse or does not go away with treatment  · You cannot keep liquids or medicines down  · Your urine gets darker, even after you drink extra liquids  · You have questions or concerns about your condition, treatment, or care  Seek care immediately or call 911 if:   · You have blood in your urine after a new injury, such as a fall  · You are urinating very small amounts or not at all  · You feel like you cannot empty your bladder  · You have severe back or side pain that does not go away with treatment  © 2014 [address] [employer] is for End User's use only and may not be sold, redistributed or otherwise used for commercial purposes  All illustrations and images included in CareNotes® are the copyrighted property of A D A M , Inc  or Gary Jarvis  The above information is an  only  It is not intended as medical advice for individual conditions or treatments  Talk to your doctor, nurse or pharmacist before following any medical regimen to see if it is safe and effective for you  Urinary Tract Infection in Men   WHAT YOU NEED TO KNOW:   A urinary tract infection (UTI) is caused by bacteria that get inside your urinary tract  Most bacteria that enter your urinary tract come out when you urinate  If the bacteria stay in your urinary tract, you may get an infection  Your urinary tract includes your kidneys, ureters, bladder, and urethra  Urine is made in your kidneys, and it flows from the ureters to the bladder  Urine leaves the bladder through the urethra  A UTI is more common in your lower urinary tract, which includes your bladder and urethra  DISCHARGE INSTRUCTIONS:   Return to the emergency department if:   · You are urinating very little or not at all  · You have a high fever with shaking chills  · You have side or back pain that gets worse  Contact your healthcare provider if:   · You have a mild fever      · You do not feel better after 2 days of taking antibiotics  · You are vomiting  · You have new symptoms, such as blood or pus in your urine  · You have questions or concerns about your condition or care  Medicines:   · Antibiotics  help fight a bacterial infection  · Medicines  may be given to decrease pain and burning when you urinate  They will also help decrease the feeling that you need to urinate often  These medicines will make your urine orange or red  · Take your medicine as directed  Contact your healthcare provider if you think your medicine is not helping or if you have side effects  Tell him of her if you are allergic to any medicine  Keep a list of the medicines, vitamins, and herbs you take  Include the amounts, and when and why you take them  Bring the list or the pill bottles to follow-up visits  Carry your medicine list with you in case of an emergency  Prevent another UTI:   · Empty your bladder often  Urinate and empty your bladder as soon as you feel the need  Do not hold your urine for long periods of time  · Drink liquids as directed  Ask how much liquid to drink each day and which liquids are best for you  You may need to drink more liquids than usual to help flush out the bacteria  Do not drink alcohol, caffeine, or citrus juices  These can irritate your bladder and increase your symptoms  Your healthcare provider may recommend cranberry juice to help prevent a UTI  · Urinate after you have sex  This can help flush out bacteria passed during sex  · Do pelvic muscle exercises often  Pelvic muscle exercises may help you start and stop urinating  Strong pelvic muscles may help you empty your bladder easier  Squeeze these muscles tightly for 5 seconds like you are trying to hold back urine  Then relax for 5 seconds  Gradually work up to squeezing for 10 seconds  Do 3 sets of 15 repetitions a day, or as directed    Follow up with your healthcare provider as directed:  Write down your questions so you remember to ask them during your visits  © 2017 [address] Information is for End User's use only and may not be sold, redistributed or otherwise used for commercial purposes  All illustrations and images included in CareNotes® are the copyrighted property of A D A M , Inc  or Gary Jarvis  The above information is an  only  It is not intended as medical advice for individual conditions or treatments  Talk to your doctor, nurse or pharmacist before following any medical regimen to see if it is safe and effective for you

## 2018-09-18 ENCOUNTER — TELEPHONE (OUTPATIENT)
Dept: ADMINISTRATIVE | Facility: OTHER | Age: 48
End: 2018-09-18

## 2018-09-18 LAB — BACTERIA UR CULT: NORMAL

## 2018-09-18 NOTE — TELEPHONE ENCOUNTER
LMOM for patient to call [facility]  He needs a follow up appointment with PCP or Urologist   ED visit documented in ED log

## 2018-09-21 ENCOUNTER — OFFICE VISIT (OUTPATIENT)
Dept: FAMILY MEDICINE CLINIC | Facility: CLINIC | Age: 48
End: 2018-09-21
Payer: COMMERCIAL

## 2018-09-21 VITALS
DIASTOLIC BLOOD PRESSURE: 58 MMHG | BODY MASS INDEX: 42.66 KG/M2 | SYSTOLIC BLOOD PRESSURE: 118 MMHG | HEART RATE: 80 BPM | TEMPERATURE: 96.3 F | WEIGHT: 315 LBS | RESPIRATION RATE: 18 BRPM | HEIGHT: 72 IN

## 2018-09-21 DIAGNOSIS — I87.2 VENOUS INSUFFICIENCY (CHRONIC) (PERIPHERAL): ICD-10-CM

## 2018-09-21 DIAGNOSIS — I10 BENIGN ESSENTIAL HYPERTENSION: ICD-10-CM

## 2018-09-21 DIAGNOSIS — R31.0 GROSS HEMATURIA: Primary | ICD-10-CM

## 2018-09-21 DIAGNOSIS — C61 ADENOCARCINOMA OF PROSTATE (HCC): ICD-10-CM

## 2018-09-21 DIAGNOSIS — E66.01 CLASS 3 SEVERE OBESITY DUE TO EXCESS CALORIES WITH SERIOUS COMORBIDITY AND BODY MASS INDEX (BMI) OF 50.0 TO 59.9 IN ADULT (HCC): ICD-10-CM

## 2018-09-21 LAB
BACTERIA BLD CULT: NORMAL
BACTERIA BLD CULT: NORMAL

## 2018-09-21 PROCEDURE — 99214 OFFICE O/P EST MOD 30 MIN: CPT | Performed by: FAMILY MEDICINE

## 2018-09-21 PROCEDURE — 3074F SYST BP LT 130 MM HG: CPT | Performed by: FAMILY MEDICINE

## 2018-09-21 PROCEDURE — 3008F BODY MASS INDEX DOCD: CPT | Performed by: FAMILY MEDICINE

## 2018-09-21 PROCEDURE — 3078F DIAST BP <80 MM HG: CPT | Performed by: FAMILY MEDICINE

## 2018-09-21 NOTE — PROGRESS NOTES
Assessment/Plan:    Problem List Items Addressed This Visit     Aden[address] of prostate ([facility] [address] )     Pt encourage to indeed see urology given his history of his prostate         Benign essential hypertension     Will stop the bystolic for now         Venous insufficiency (chronic) (peripheral)      Other Visit Diagnoses     Gross hematuria    -  Primary    Class 3 severe obesity due to excess calories with serious comorbidity and body mass index (BMI) of 50 0 to 59 9 in adult ([facility] [address] )        BMI 50 0-59 9, adult ([facility] )          studies from the ed were reviewed  Pt advised to call me if the urine does not clear completely and stay clear    There are no Patient Instructions on file for this visit  No Follow-up on file  Subjective:      Patient ID: Rodrigo Gonzáles is a 50 y o  male  Chief Complaint   Patient presents with    Follow-up     SLW  UTI /blood in urine prcma       Pt is here for a follow up from the ed, blood in the urine  Pt was home doing yardwork  States he went in the house to urinate and he had a lot of blood in his urine  The next day pt still had blood and sukumar t he had a temp so he went to the ed  Pt had no issues with flow was passing clots  Was given abx  Dx with   Pt states he set up a follow up with urology has an appt on oct first - so he wanted to be seen here in th emeantime    Now his urine is mostly clear    Pt states hsi weight is down to 405 - is doing well on keto diet    Pt not interested in flu shot today  The following portions of the patient's history were reviewed and updated as appropriate: allergies, current medications, past family history, past medical history, past social history, past surgical history and problem list     Review of Systems   Constitutional: Negative for activity change, appetite change, chills, diaphoresis, fatigue, fever and unexpected weight change     HENT: Negative for congestion, dental problem, ear pain, mouth sores, sinus pain, sinus pressure, sore throat and trouble swallowing  Eyes: Negative for photophobia, discharge and itching  Respiratory: Negative for apnea, chest tightness and shortness of breath  Cardiovascular: Negative for chest pain, palpitations and leg swelling  Gastrointestinal: Negative for abdominal distention, abdominal pain, blood in stool, nausea and vomiting  Endocrine: Negative for cold intolerance, heat intolerance, polydipsia, polyphagia and polyuria  Genitourinary: Negative for difficulty urinating  Musculoskeletal: Negative for arthralgias  Skin: Negative for color change and wound  Neurological: Negative for dizziness, syncope, speech difficulty and headaches  Hematological: Negative for adenopathy  Psychiatric/Behavioral: Negative for agitation and behavioral problems  Current Outpatient Prescriptions   Medication Sig Dispense Refill    allopurinol (ZYLOPRIM) 100 mg tablet Take 1 tablet (100 mg total) by mouth daily for 30 days 90 tablet 1    APPLE CIDER VINEGAR PO Take by mouth      cefadroxil (DURICEF) 500 mg capsule Take 1 capsule (500 mg total) by mouth every 12 (twelve) hours for 10 days 20 capsule 0    colchicine (COLCRYS) 0 6 mg tablet 0 6 mg as needed        olmesartan (BENICAR) 40 mg tablet Take 1 tablet (40 mg total) by mouth daily 90 tablet 1     No current facility-administered medications for this visit  Objective:    /58   Pulse 80   Temp (!) 96 3 °F (35 7 °C)   Resp 18   Ht 6' (1 829 m)   Wt (!) 184 kg (405 lb)   BMI 54 93 kg/m²        Physical Exam   Constitutional: He appears well-developed and well-nourished  No distress  HENT:   Head: Normocephalic and atraumatic  Right Ear: External ear normal    Left Ear: External ear normal    Nose: Nose normal    Mouth/Throat: Oropharynx is clear and moist  No oropharyngeal exudate  Eyes: EOM are normal  Pupils are equal, round, and reactive to light  Right eye exhibits no discharge   Left eye exhibits no discharge  No scleral icterus  Neck: No thyromegaly present  Cardiovascular: Normal rate and normal heart sounds  No murmur heard  Pulmonary/Chest: Effort normal and breath sounds normal  No respiratory distress  He has no wheezes  Abdominal: Soft  Bowel sounds are normal  He exhibits no distension and no mass  There is no tenderness  There is no rebound and no guarding  Musculoskeletal: Normal range of motion  Neurological: He is alert  He displays normal reflexes  Coordination normal    Skin: Skin is warm and dry  No rash noted  He is not diaphoretic  No erythema  Psychiatric: He has a normal mood and affect  His behavior is normal    Nursing note and vitals reviewed  CT renal stone study abdomen pelvis without contrast   Status: Final result   PACS Images     Show images for CT renal stone study abdomen pelvis without contrast   Order Report      Order Details   Study Result     CT ABDOMEN AND PELVIS WITHOUT IV CONTRAST - LOW DOSE RENAL STONE      INDICATION:   flank pain, fever  Fever and flank pain      COMPARISON:  None      TECHNIQUE:  Low dose thin section CT examination of the abdomen and pelvis was performed without intravenous or oral contrast according to a protocol specifically designed to evaluate for urinary tract calculus  Axial, sagittal, and coronal 2D   reformatted images were created from the source data and submitted for interpretation  Evaluation for pathology in the abdomen and pelvis that is unrelated to urinary tract calculi is limited       Radiation dose length product (DLP) for this visit:  2447 24 mGy-cm   This examination, like all CT scans performed in the [facility], was performed utilizing techniques to minimize radiation dose exposure, including the use of   iterative reconstruction and automated exposure control       FINDINGS:     RIGHT KIDNEY AND URETER:  No urinary tract calculi    No hydronephrosis or hydroureter      LEFT KIDNEY AND URETER:  No urinary tract calculi  No hydronephrosis or hydroureter      URINARY BLADDER:   Unremarkable       Bibasilar dependent atelectasis      Limited low radiation dose noncontrast CT evaluation demonstrates no clinically significant abnormality of liver, spleen, pancreas, or adrenal glands  No calcified gallstones or gallbladder wall thickening noted  No ascites or bulky lymphadenopathy on this limited noncontrast study  Colonic diverticula are noted, without evidence to suggest acute diverticulitis  Visualized bowel appears otherwise unremarkable  Small hiatal hernia  Limited evaluation demonstrates no evidence to suggest acute appendicitis  No acute fracture or destructive osseous lesion is identified         IMPRESSION:  1  No obstructive uropathy    2   Colonic diverticulosis without evidence of acute diverticulitis              Wayne Messi, DO

## 2018-10-02 DIAGNOSIS — I10 BENIGN ESSENTIAL HYPERTENSION: ICD-10-CM

## 2018-10-02 RX ORDER — OLMESARTAN MEDOXOMIL 40 MG/1
40 TABLET ORAL DAILY
Qty: 90 TABLET | Refills: 1 | Status: SHIPPED | OUTPATIENT
Start: 2018-10-02 | End: 2019-01-03 | Stop reason: SDUPTHER

## 2018-11-20 ENCOUNTER — OFFICE VISIT (OUTPATIENT)
Dept: PULMONOLOGY | Facility: MEDICAL CENTER | Age: 48
End: 2018-11-20
Payer: COMMERCIAL

## 2018-11-20 VITALS
DIASTOLIC BLOOD PRESSURE: 70 MMHG | HEIGHT: 72 IN | BODY MASS INDEX: 42.66 KG/M2 | HEART RATE: 70 BPM | TEMPERATURE: 98.6 F | WEIGHT: 315 LBS | SYSTOLIC BLOOD PRESSURE: 132 MMHG | RESPIRATION RATE: 16 BRPM | OXYGEN SATURATION: 94 %

## 2018-11-20 DIAGNOSIS — G47.33 OBSTRUCTIVE SLEEP APNEA: ICD-10-CM

## 2018-11-20 DIAGNOSIS — G47.33 OSA (OBSTRUCTIVE SLEEP APNEA): Primary | ICD-10-CM

## 2018-11-20 PROCEDURE — 99243 OFF/OP CNSLTJ NEW/EST LOW 30: CPT | Performed by: INTERNAL MEDICINE

## 2018-11-20 RX ORDER — ALLOPURINOL 100 MG/1
TABLET ORAL
COMMUNITY
Start: 2018-10-01 | End: 2019-01-03 | Stop reason: SDUPTHER

## 2018-11-20 NOTE — PROGRESS NOTES
Assessment/Plan:       Problem List Items Addressed This Visit        Respiratory    Obstructive sleep apnea     Compliance data is reviewed and patient is compliant with the use of his machine  He require new supplies  This is been ordered for him through [facility]              Other Visit Diagnoses     ARIEL (obstructive sleep apnea)    -  Primary    Relevant Orders    PAP DME Resupply/Reorder            All questions are answered to the patient's satisfaction and understanding  He verbalizes understanding  He is encouraged to call with any further questions or concerns  Portions of the record may have been created with voice recognition software  Occasional wrong word or "sound a like" substitutions may have occurred due to the inherent limitations of voice recognition software  Read the chart carefully and recognize, using context, where substitutions have occurred  a    Electronically Signed by Saw Bess MD    ______________________________________________________________________    Chief Complaint:   Chief Complaint   Patient presents with    [facility]     Sleep Apnea        Patient ID: Fady Hendrix is a 50 y o  y o  male has a past medical history of Arthropathy (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 His DME company is [employer]    No daytime sleepiness  He uses full face mask    Lost about 60 pounds  HPI    Review of Systems   All other systems reviewed and are negative  Social history: He reports that he has never smoked   He has never used smokeless tobacco  He reports that he does not drink alcohol or use drugs  Past surgical history:   Past Surgical History:   Procedure Laterality Date    PROSTATECTOMY      SHOULDER SURGERY Left      Family history:   Family History   Problem Relation Age of Onset    No Known Problems Mother     Coronary artery disease Family     Esophageal cancer Family     Heart disease Family        Immunization History   Administered Date(s) Administered    Tdap 04/12/2013     Current Outpatient Prescriptions   Medication Sig Dispense Refill    APPLE CIDER VINEGAR PO Take by mouth      colchicine (COLCRYS) 0 6 mg tablet 0 6 mg as needed        olmesartan (BENICAR) 40 mg tablet Take 1 tablet (40 mg total) by mouth daily 90 tablet 1    allopurinol (ZYLOPRIM) 100 mg tablet Take 1 tablet (100 mg total) by mouth daily for 30 days 90 tablet 1    allopurinol (ZYLOPRIM) 100 mg tablet        No current facility-administered medications for this visit  Allergies: Patient has no known allergies  Objective:  Vitals:    11/20/18 0853   BP: 132/70   BP Location: Right arm   Patient Position: Sitting   Cuff Size: Standard   Pulse: 70   Resp: 16   Temp: 98 6 °F (37 °C)   TempSrc: Tympanic   SpO2: 94%   Weight: (!) 181 kg (400 lb)   Height: 6' (1 829 m)   Oxygen Therapy  SpO2: 94 %    Wt Readings from Last 3 Encounters:   11/20/18 (!) 181 kg (400 lb)   09/21/18 (!) 184 kg (405 lb)   09/16/18 (!) 184 kg (405 lb)     Body mass index is 54 25 kg/m²  Physical Exam   Constitutional: He is oriented to person, place, and time  He appears well-developed and well-nourished  No distress  HENT:   Head: Normocephalic and atraumatic  Mouth/Throat: Oropharynx is clear and moist  No oropharyngeal exudate  Mallampati 4  Tonsillar hypertrophy   Eyes: Pupils are equal, round, and reactive to light  EOM are normal    Neck: Normal range of motion  Neck supple  No tracheal deviation present  No thyromegaly present  Neck circumference 19   Cardiovascular: Normal rate and regular rhythm      No murmur heard  Pulmonary/Chest: Effort normal and breath sounds normal  No respiratory distress  He has no wheezes  He has no rales  He exhibits no tenderness  Abdominal: Soft  Bowel sounds are normal  He exhibits no distension  There is no tenderness  Musculoskeletal: Normal range of motion  He exhibits no edema  Lymphadenopathy:     He has no cervical adenopathy  Neurological: He is alert and oriented to person, place, and time  No cranial nerve deficit  Skin: Skin is warm and dry  He is not diaphoretic  Psychiatric: He has a normal mood and affect  His behavior is normal    Vitals reviewed

## 2018-11-23 ENCOUNTER — OFFICE VISIT (OUTPATIENT)
Dept: FAMILY MEDICINE CLINIC | Facility: CLINIC | Age: 48
End: 2018-11-23
Payer: COMMERCIAL

## 2018-11-23 VITALS
RESPIRATION RATE: 18 BRPM | HEIGHT: 72 IN | DIASTOLIC BLOOD PRESSURE: 58 MMHG | BODY MASS INDEX: 54.25 KG/M2 | TEMPERATURE: 96.6 F | HEART RATE: 96 BPM | SYSTOLIC BLOOD PRESSURE: 120 MMHG

## 2018-11-23 DIAGNOSIS — R10.9 FLANK PAIN: Primary | ICD-10-CM

## 2018-11-23 DIAGNOSIS — M17.0 PRIMARY OSTEOARTHRITIS OF BOTH KNEES: ICD-10-CM

## 2018-11-23 DIAGNOSIS — I10 BENIGN ESSENTIAL HYPERTENSION: ICD-10-CM

## 2018-11-23 LAB
SL AMB  POCT GLUCOSE, UA: NORMAL
SL AMB LEUKOCYTE ESTERASE,UA: NORMAL
SL AMB POCT BILIRUBIN,UA: NORMAL
SL AMB POCT BLOOD,UA: NORMAL
SL AMB POCT CLARITY,UA: CLEAR
SL AMB POCT COLOR,UA: YELLOW
SL AMB POCT KETONES,UA: NORMAL
SL AMB POCT NITRITE,UA: NORMAL
SL AMB POCT PH,UA: 5
SL AMB POCT SPECIFIC GRAVITY,UA: 1.01
SL AMB POCT URINE PROTEIN: NORMAL
SL AMB POCT UROBILINOGEN: NORMAL

## 2018-11-23 PROCEDURE — 99214 OFFICE O/P EST MOD 30 MIN: CPT | Performed by: FAMILY MEDICINE

## 2018-11-23 PROCEDURE — 3078F DIAST BP <80 MM HG: CPT | Performed by: FAMILY MEDICINE

## 2018-11-23 PROCEDURE — 3074F SYST BP LT 130 MM HG: CPT | Performed by: FAMILY MEDICINE

## 2018-11-23 PROCEDURE — 81003 URINALYSIS AUTO W/O SCOPE: CPT | Performed by: FAMILY MEDICINE

## 2018-11-23 RX ORDER — MELOXICAM 15 MG/1
15 TABLET ORAL DAILY
Qty: 30 TABLET | Refills: 0 | Status: SHIPPED | OUTPATIENT
Start: 2018-11-23 | End: 2019-01-03 | Stop reason: SDUPTHER

## 2018-11-23 NOTE — PROGRESS NOTES
Assessment/Plan:    Problem List Items Addressed This Visit     Benign essential hypertension    Osteoarthritis of both knees    Relevant Medications    diclofenac sodium (VOLTAREN) 1 %      Other Visit Diagnoses     Flank pain    -  Primary    Relevant Medications    meloxicam (MOBIC) 15 mg tablet    Other Relevant Orders    POCT urine dip auto non-scope      Ua is clean    There are no Patient Instructions on file for this visit  No Follow-up on file  Subjective:      Patient ID: Fabiola Woodward is a 50 y o  male  Chief Complaint   Patient presents with    side and lower back pain     started 2 weeks ago       Pt states he started havinfg a sharp pain in his left lower flank  Pt states last time he was here he had UTI's has not had blood or anything since  Pt states this pain in the flank travels to his lower back  States it has been two weeks  Pain is improving  Pt is going back on his keto diet    Pt states a few years ago we prescribed him an ointment for his knee  States it never made it to pharm would like it        The following portions of the patient's history were reviewed and updated as appropriate: allergies, current medications, past family history, past medical history, past social history, past surgical history and problem list     Review of Systems   Constitutional: Negative for activity change, appetite change, chills, diaphoresis, fatigue, fever and unexpected weight change  HENT: Negative for congestion, dental problem, ear pain, mouth sores, sinus pain, sinus pressure, sore throat and trouble swallowing  Eyes: Negative for photophobia, discharge and itching  Respiratory: Negative for apnea, chest tightness and shortness of breath  Cardiovascular: Negative for chest pain, palpitations and leg swelling  Gastrointestinal: Negative for abdominal distention, abdominal pain, blood in stool, nausea and vomiting     Endocrine: Negative for cold intolerance, heat intolerance, polydipsia, polyphagia and polyuria  Genitourinary: Negative for difficulty urinating  Musculoskeletal: Positive for arthralgias  Skin: Negative for color change and wound  Neurological: Negative for dizziness, syncope, speech difficulty and headaches  Hematological: Negative for adenopathy  Psychiatric/Behavioral: Negative for agitation and behavioral problems  Current Outpatient Prescriptions   Medication Sig Dispense Refill    allopurinol (ZYLOPRIM) 100 mg tablet       APPLE CIDER VINEGAR PO Take by mouth      colchicine (COLCRYS) 0 6 mg tablet 0 6 mg as needed        olmesartan (BENICAR) 40 mg tablet Take 1 tablet (40 mg total) by mouth daily 90 tablet 1    allopurinol (ZYLOPRIM) 100 mg tablet Take 1 tablet (100 mg total) by mouth daily for 30 days 90 tablet 1    diclofenac sodium (VOLTAREN) 1 % Apply 4 g topically 4 (four) times a day for 90 days 1440 g 0    meloxicam (MOBIC) 15 mg tablet Take 1 tablet (15 mg total) by mouth daily for 30 days As needed 30 tablet 0     No current facility-administered medications for this visit  Objective:    /58   Pulse 96   Temp (!) 96 6 °F (35 9 °C)   Resp 18   Ht 6' (1 829 m)   BMI 54 25 kg/m²        Physical Exam   Constitutional: He appears well-developed and well-nourished  No distress  HENT:   Head: Normocephalic and atraumatic  Right Ear: External ear normal    Left Ear: External ear normal    Nose: Nose normal    Mouth/Throat: Oropharynx is clear and moist  No oropharyngeal exudate  Eyes: Pupils are equal, round, and reactive to light  EOM are normal  Right eye exhibits no discharge  Left eye exhibits no discharge  No scleral icterus  Neck: No thyromegaly present  Cardiovascular: Normal rate and normal heart sounds  No murmur heard  Pulmonary/Chest: Effort normal and breath sounds normal  No respiratory distress  He has no wheezes  Abdominal: Soft   Bowel sounds are normal  He exhibits no distension and no mass  There is no tenderness  There is no rebound and no guarding  Musculoskeletal: Normal range of motion  Neurological: He is alert  He displays normal reflexes  Coordination normal    Skin: Skin is warm and dry  No rash noted  He is not diaphoretic  No erythema  Psychiatric: He has a normal mood and affect  His behavior is normal    Nursing note and vitals reviewed               Rodolfo Carr DO

## 2018-11-26 NOTE — ASSESSMENT & PLAN NOTE
Compliance data is reviewed and patient is compliant with the use of his machine  He require new supplies    This is been ordered for him through [facility]

## 2018-12-11 DIAGNOSIS — M10.9 GOUT, UNSPECIFIED CAUSE, UNSPECIFIED CHRONICITY, UNSPECIFIED SITE: Primary | ICD-10-CM

## 2018-12-11 RX ORDER — COLCHICINE 0.6 MG/1
0.6 TABLET ORAL DAILY
Qty: 12 TABLET | Refills: 1 | Status: SHIPPED | OUTPATIENT
Start: 2018-12-11 | End: 2019-06-22 | Stop reason: SDUPTHER

## 2019-01-03 DIAGNOSIS — I10 BENIGN ESSENTIAL HYPERTENSION: ICD-10-CM

## 2019-01-03 DIAGNOSIS — R10.9 FLANK PAIN: ICD-10-CM

## 2019-01-03 DIAGNOSIS — M10.9 GOUT, UNSPECIFIED CAUSE, UNSPECIFIED CHRONICITY, UNSPECIFIED SITE: Primary | ICD-10-CM

## 2019-01-03 RX ORDER — ALLOPURINOL 100 MG/1
100 TABLET ORAL DAILY
Qty: 90 TABLET | Refills: 1 | Status: SHIPPED | OUTPATIENT
Start: 2019-01-03 | End: 2019-04-03 | Stop reason: SDUPTHER

## 2019-01-03 RX ORDER — OLMESARTAN MEDOXOMIL 40 MG/1
40 TABLET ORAL DAILY
Qty: 90 TABLET | Refills: 1 | Status: SHIPPED | OUTPATIENT
Start: 2019-01-03 | End: 2019-04-03 | Stop reason: SDUPTHER

## 2019-01-03 RX ORDER — MELOXICAM 15 MG/1
15 TABLET ORAL DAILY
Qty: 30 TABLET | Refills: 0 | Status: SHIPPED | OUTPATIENT
Start: 2019-01-03 | End: 2019-03-04 | Stop reason: SDUPTHER

## 2019-03-04 ENCOUNTER — OFFICE VISIT (OUTPATIENT)
Dept: FAMILY MEDICINE CLINIC | Facility: CLINIC | Age: 49
End: 2019-03-04
Payer: COMMERCIAL

## 2019-03-04 VITALS
RESPIRATION RATE: 16 BRPM | TEMPERATURE: 97.9 F | DIASTOLIC BLOOD PRESSURE: 82 MMHG | SYSTOLIC BLOOD PRESSURE: 140 MMHG | HEART RATE: 80 BPM | WEIGHT: 315 LBS | BODY MASS INDEX: 42.66 KG/M2 | HEIGHT: 72 IN

## 2019-03-04 DIAGNOSIS — N50.89 SCROTAL ULCER: ICD-10-CM

## 2019-03-04 DIAGNOSIS — R30.0 DYSURIA: ICD-10-CM

## 2019-03-04 DIAGNOSIS — R10.9 FLANK PAIN: ICD-10-CM

## 2019-03-04 DIAGNOSIS — I10 BENIGN ESSENTIAL HYPERTENSION: ICD-10-CM

## 2019-03-04 DIAGNOSIS — E66.01 MORBID OBESITY (HCC): ICD-10-CM

## 2019-03-04 DIAGNOSIS — N39.45 CONTINUOUS LEAKAGE OF URINE: ICD-10-CM

## 2019-03-04 DIAGNOSIS — M17.0 PRIMARY OSTEOARTHRITIS OF BOTH KNEES: Primary | ICD-10-CM

## 2019-03-04 PROCEDURE — 81003 URINALYSIS AUTO W/O SCOPE: CPT | Performed by: FAMILY MEDICINE

## 2019-03-04 PROCEDURE — 3008F BODY MASS INDEX DOCD: CPT | Performed by: FAMILY MEDICINE

## 2019-03-04 PROCEDURE — 99214 OFFICE O/P EST MOD 30 MIN: CPT | Performed by: FAMILY MEDICINE

## 2019-03-04 PROCEDURE — 1036F TOBACCO NON-USER: CPT | Performed by: FAMILY MEDICINE

## 2019-03-04 RX ORDER — NYSTATIN 100000 [USP'U]/G
POWDER TOPICAL 3 TIMES DAILY
Qty: 60 G | Refills: 3 | Status: SHIPPED | OUTPATIENT
Start: 2019-03-04 | End: 2019-05-05 | Stop reason: SDUPTHER

## 2019-03-04 RX ORDER — CEPHALEXIN 500 MG/1
500 CAPSULE ORAL EVERY 12 HOURS SCHEDULED
Qty: 20 CAPSULE | Refills: 0 | Status: SHIPPED | OUTPATIENT
Start: 2019-03-04 | End: 2019-03-14

## 2019-03-04 RX ORDER — MELOXICAM 15 MG/1
15 TABLET ORAL DAILY
Qty: 30 TABLET | Refills: 5 | Status: SHIPPED | OUTPATIENT
Start: 2019-03-04 | End: 2019-04-03 | Stop reason: SDUPTHER

## 2019-03-04 NOTE — PATIENT INSTRUCTIONS
Obesity   AMBULATORY CARE:   Obesity  is when your body mass index (BMI) is greater than 30  Your healthcare provider will use your height and weight to measure your BMI  The risks of obesity include  many health problems, such as injuries or physical disability  You may need tests to check for the following:  · Diabetes     · High blood pressure or high cholesterol     · Heart disease     · Gallbladder or liver disease     · Cancer of the colon, breast, prostate, liver, or kidney     · Sleep apnea     · Arthritis or gout  Seek care immediately if:   · You have a severe headache, confusion, or difficulty speaking  · You have weakness on one side of your body  · You have chest pain, sweating, or shortness of breath  Contact your healthcare provider if:   · You have symptoms of gallbladder or liver disease, such as pain in your upper abdomen  · You have knee or hip pain and discomfort while walking  · You have symptoms of diabetes, such as intense hunger and thirst, and frequent urination  · You have symptoms of sleep apnea, such as snoring or daytime sleepiness  · You have questions or concerns about your condition or care  Treatment for obesity  focuses on helping you lose weight to improve your health  Even a small decrease in BMI can reduce the risk for many health problems  Your healthcare provider will help you set a weight-loss goal   · Lifestyle changes  are the first step in treating obesity  These include making healthy food choices and getting regular physical activity  Your healthcare provider may suggest a weight-loss program that involves coaching, education, and therapy  · Medicine  may help you lose weight when it is used with a healthy diet and physical activity  · Surgery  can help you lose weight if you are very obese and have other health problems  There are several types of weight-loss surgery  Ask your healthcare provider for more information    Be successful losing weight:   · Set small, realistic goals  An example of a small goal is to walk for 20 minutes 5 days a week  Anther goal is to lose 5% of your body weight  · Tell friends, family members, and coworkers about your goals  and ask for their support  Ask a friend to lose weight with you, or join a weight-loss support group  · Identify foods or triggers that may cause you to overeat , and find ways to avoid them  Remove tempting high-calorie foods from your home and workplace  Place a bowl of fresh fruit on your kitchen counter  If stress causes you to eat, then find other ways to cope with stress  · Keep a diary to track what you eat and drink  Also write down how many minutes of physical activity you do each day  Weigh yourself once a week and record it in your diary  Eating changes: You will need to eat 500 to 1,000 fewer calories each day than you currently eat to lose 1 to 2 pounds a week  The following changes will help you cut calories:  · Eat smaller portions  Use small plates, no larger than 9 inches in diameter  Fill your plate half full of fruits and vegetables  Measure your food using measuring cups until you know what a serving size looks like  · Eat 3 meals and 1 or 2 snacks each day  Plan your meals in advance  [place] and eat at home most of the time  Eat slowly  · Eat fruits and vegetables at every meal   They are low in calories and high in fiber, which makes you feel full  Do not add butter, margarine, or cream sauce to vegetables  Use herbs to season steamed vegetables  · Eat less fat and fewer fried foods  Eat more baked or grilled chicken and fish  These protein sources are lower in calories and fat than red meat  Limit fast food  Dress your salads with olive oil and vinegar instead of bottled dressing  · Limit the amount of sugar you eat  Do not drink sugary beverages  Limit alcohol  Activity changes:  Physical activity is good for your body in many ways   It helps you burn calories and build strong muscles  It decreases stress and depression, and improves your mood  It can also help you sleep better  Talk to your healthcare provider before you begin an exercise program   · Exercise for at least 30 minutes 5 days a week  Start slowly  Set aside time each day for physical activity that you enjoy and that is convenient for you  It is best to do both weight training and an activity that increases your heart rate, such as walking, bicycling, or swimming  · Find ways to be more active  Do yard work and housecleaning  Walk up the stairs instead of using elevators  Spend your leisure time going to events that require walking, such as outdoor festivals or fairs  This extra physical activity can help you lose weight and keep it off  Follow up with your healthcare provider as directed: You may need to meet with a dietitian  Write down your questions so you remember to ask them during your visits  © 2017 [address] Information is for End User's use only and may not be sold, redistributed or otherwise used for commercial purposes  All illustrations and images included in CareNotes® are the copyrighted property of [employer]  or Gary Jarvis  The above information is an  only  It is not intended as medical advice for individual conditions or treatments  Talk to your doctor, nurse or pharmacist before following any medical regimen to see if it is safe and effective for you  Weight Management   AMBULATORY CARE:   Why it is important to manage your weight:  Being overweight increases your risk of health conditions such as heart disease, high blood pressure, type 2 diabetes, and certain types of cancer  It can also increase your risk for osteoarthritis, sleep apnea, and other respiratory problems  Aim for a slow, steady weight loss  Even a small amount of weight loss can lower your risk of health problems    How to lose weight safely:  A safe and healthy way to lose weight is to eat fewer calories and get regular exercise  You can lose up about 1 pound a week by decreasing the number of calories you eat by 500 calories each day  You can decrease calories by eating smaller portion sizes or by cutting out high-calorie foods  Read labels to find out how many calories are in the foods you eat  You can also burn calories with exercise such as walking, swimming, or biking  You will be more likely to keep weight off if you make these changes part of your lifestyle  Healthy meal plan for weight management:  A healthy meal plan includes a variety of foods, contains fewer calories, and helps you stay healthy  A healthy meal plan includes the following:  · Eat whole-grain foods more often  A healthy meal plan should contain fiber  Fiber is the part of grains, fruits, and vegetables that is not broken down by your body  Whole-grain foods are healthy and provide extra fiber in your diet  Some examples of whole-grain foods are whole-wheat breads and pastas, oatmeal, brown rice, and bulgur  · Eat a variety of vegetables every day  Include dark, leafy greens such as spinach, kale, dora greens, and mustard greens  Eat yellow and orange vegetables such as carrots, sweet potatoes, and winter squash  · Eat a variety of fruits every day  Choose fresh or canned fruit (canned in its own juice or light syrup) instead of juice  Fruit juice has very little or no fiber  · Eat low-fat dairy foods  Drink fat-free (skim) milk or 1% milk  Eat fat-free yogurt and low-fat cottage cheese  Try low-fat cheeses such as mozzarella and other reduced-fat cheeses  · Choose meat and other protein foods that are low in fat  Choose beans or other legumes such as split peas or lentils  Choose fish, skinless poultry (chicken or turkey), or lean cuts of red meat (beef or pork)  Before you cook meat or poultry, cut off any visible fat  · Use less fat and oil  Try baking foods instead of frying them  Add less fat, such as margarine, sour cream, regular salad dressing and mayonnaise to foods  Eat fewer high-fat foods  Some examples of high-fat foods include french fries, doughnuts, ice cream, and cakes  · Eat fewer sweets  Limit foods and drinks that are high in sugar  This includes candy, cookies, regular soda, and sweetened drinks  Ways to decrease calories:   · Eat smaller portions  ¨ Use a small plate with smaller servings  ¨ Do not eat second helpings  ¨ When you eat at a restaurant, ask for a box and place half of your meal in the box before you eat  ¨ Share an entrée with someone else  · Replace high-calorie snacks with healthy, low-calorie snacks  ¨ Choose fresh fruit, vegetables, fat-free rice cakes, or air-popped popcorn instead of potato chips, nuts, or chocolate  ¨ Choose water or calorie-free drinks instead of soda or sweetened drinks  · Eat regular meals  Skipping meals can lead to overeating later in the day  Eat a healthy snack in place of a meal if you do not have time to eat a regular meal      · Do not shop for groceries when you are hungry  You may be more likely to make unhealthy food choices  Take a grocery list of healthy foods and shop after you have eaten  Exercise:  Exercise at least 30 minutes per day on most days of the week  Some examples of exercise include walking, biking, dancing, and swimming  You can also fit in more physical activity by taking the stairs instead of the elevator or parking farther away from stores  Ask your healthcare provider about the best exercise plan for you  Other things to consider as you try to lose weight:   · Be aware of situations that may give you the urge to overeat, such as eating while watching television  Find ways to avoid these situations  For example, read a book, go for a walk, or do crafts      · Meet with a weight loss support group or friends who are also trying to lose weight  This may help you stay motivated to continue working on your weight loss goals  © 2017 [address] [employer] Information is for End User's use only and may not be sold, redistributed or otherwise used for commercial purposes  All illustrations and images included in CareNotes® are the copyrighted property of [employer]  or Gary Jarvis  The above information is an  only  It is not intended as medical advice for individual conditions or treatments  Talk to your doctor, nurse or pharmacist before following any medical regimen to see if it is safe and effective for you

## 2019-03-04 NOTE — PROGRESS NOTES
Assessment/Plan:    Problem List Items Addressed This Visit        Cardiovascular and Mediastinum    Benign essential hypertension       Musculoskeletal and Integument    Osteoarthritis of both knees - Primary       Other    Continuous leakage of urine     Pt advised on moving up his appt with urology as his scrotal lesions will get worse the more he is leaking  He is not doing his keagles as mucha s he was and he should be           Other Visit Diagnoses     Flank pain        Relevant Medications    meloxicam (MOBIC) 15 mg tablet    Dysuria        Scrotal ulcer        Morbid obesity ([address] )              BMI Counseling: Body mass index is 54 93 kg/m²  Discussed the patient's BMI with him  The BMI is above average  BMI counseling and education was provided to the patient  Nutrition recommendations include reducing portion sizes  Patient Instructions     Obesity   AMBULATORY CARE:   Obesity  is when your body mass index (BMI) is greater than 30  Your healthcare provider will use your height and weight to measure your BMI  The risks of obesity include  many health problems, such as injuries or physical disability  You may need tests to check for the following:  · Diabetes     · High blood pressure or high cholesterol     · Heart disease     · Gallbladder or liver disease     · Cancer of the colon, breast, prostate, liver, or kidney     · Sleep apnea     · Arthritis or gout  Seek care immediately if:   · You have a severe headache, confusion, or difficulty speaking  · You have weakness on one side of your body  · You have chest pain, sweating, or shortness of breath  Contact your healthcare provider if:   · You have symptoms of gallbladder or liver disease, such as pain in your upper abdomen  · You have knee or hip pain and discomfort while walking  · You have symptoms of diabetes, such as intense hunger and thirst, and frequent urination       · You have symptoms of sleep apnea, such as snoring or daytime sleepiness  · You have questions or concerns about your condition or care  Treatment for obesity  focuses on helping you lose weight to improve your health  Even a small decrease in BMI can reduce the risk for many health problems  Your healthcare provider will help you set a weight-loss goal   · Lifestyle changes  are the first step in treating obesity  These include making healthy food choices and getting regular physical activity  Your healthcare provider may suggest a weight-loss program that involves coaching, education, and therapy  · Medicine  may help you lose weight when it is used with a healthy diet and physical activity  · Surgery  can help you lose weight if you are very obese and have other health problems  There are several types of weight-loss surgery  Ask your healthcare provider for more information  Be successful losing weight:   · Set small, realistic goals  An example of a small goal is to walk for 20 minutes 5 days a week  Anther goal is to lose 5% of your body weight  · Tell friends, family members, and coworkers about your goals  and ask for their support  Ask a friend to lose weight with you, or join a weight-loss support group  · Identify foods or triggers that may cause you to overeat , and find ways to avoid them  Remove tempting high-calorie foods from your home and workplace  Place a bowl of fresh fruit on your kitchen counter  If stress causes you to eat, then find other ways to cope with stress  · Keep a diary to track what you eat and drink  Also write down how many minutes of physical activity you do each day  Weigh yourself once a week and record it in your diary  Eating changes: You will need to eat 500 to 1,000 fewer calories each day than you currently eat to lose 1 to 2 pounds a week  The following changes will help you cut calories:  · Eat smaller portions  Use small plates, no larger than 9 inches in diameter   Fill your plate half full of fruits and vegetables  Measure your food using measuring cups until you know what a serving size looks like  · Eat 3 meals and 1 or 2 snacks each day  Plan your meals in advance  Orlando Camacho and eat at home most of the time  Eat slowly  · Eat fruits and vegetables at every meal   They are low in calories and high in fiber, which makes you feel full  Do not add butter, margarine, or cream sauce to vegetables  Use herbs to season steamed vegetables  · Eat less fat and fewer fried foods  Eat more baked or grilled chicken and fish  These protein sources are lower in calories and fat than red meat  Limit fast food  Dress your salads with olive oil and vinegar instead of bottled dressing  · Limit the amount of sugar you eat  Do not drink sugary beverages  Limit alcohol  Activity changes:  Physical activity is good for your body in many ways  It helps you burn calories and build strong muscles  It decreases stress and depression, and improves your mood  It can also help you sleep better  Talk to your healthcare provider before you begin an exercise program   · Exercise for at least 30 minutes 5 days a week  Start slowly  Set aside time each day for physical activity that you enjoy and that is convenient for you  It is best to do both weight training and an activity that increases your heart rate, such as walking, bicycling, or swimming  · Find ways to be more active  Do yard work and housecleaning  Walk up the stairs instead of using elevators  Spend your leisure time going to events that require walking, such as outdoor festivals or fairs  This extra physical activity can help you lose weight and keep it off  Follow up with your healthcare provider as directed: You may need to meet with a dietitian  Write down your questions so you remember to ask them during your visits     © 2017 [employer] Information is for End User's use only and may not be sold, redistributed or otherwise used for commercial purposes  All illustrations and images included in CareNotes® are the copyrighted property of A D [employer] M [employer] Inc  or Gary Jarvis  The above information is an  only  It is not intended as medical advice for individual conditions or treatments  Talk to your doctor, nurse or pharmacist before following any medical regimen to see if it is safe and effective for you  Weight Management   AMBULATORY CARE:   Why it is important to manage your weight:  Being overweight increases your risk of health conditions such as heart disease, high blood pressure, type 2 diabetes, and certain types of cancer  It can also increase your risk for osteoarthritis, sleep apnea, and other respiratory problems  Aim for a slow, steady weight loss  Even a small amount of weight loss can lower your risk of health problems  How to lose weight safely:  A safe and healthy way to lose weight is to eat fewer calories and get regular exercise  You can lose up about 1 pound a week by decreasing the number of calories you eat by 500 calories each day  You can decrease calories by eating smaller portion sizes or by cutting out high-calorie foods  Read labels to find out how many calories are in the foods you eat  You can also burn calories with exercise such as walking, swimming, or biking  You will be more likely to keep weight off if you make these changes part of your lifestyle  Healthy meal plan for weight management:  A healthy meal plan includes a variety of foods, contains fewer calories, and helps you stay healthy  A healthy meal plan includes the following:  · Eat whole-grain foods more often  A healthy meal plan should contain fiber  Fiber is the part of grains, fruits, and vegetables that is not broken down by your body  Whole-grain foods are healthy and provide extra fiber in your diet  Some examples of whole-grain foods are whole-wheat breads and pastas, oatmeal, brown rice, and bulgur      · Eat a variety of vegetables every day  Include dark, leafy greens such as spinach, kale, dora greens, and mustard greens  Eat yellow and orange vegetables such as carrots, sweet potatoes, and winter squash  · Eat a variety of fruits every day  Choose fresh or canned fruit (canned in its own juice or light syrup) instead of juice  Fruit juice has very little or no fiber  · Eat low-fat dairy foods  Drink fat-free (skim) milk or 1% milk  Eat fat-free yogurt and low-fat cottage cheese  Try low-fat cheeses such as mozzarella and other reduced-fat cheeses  · Choose meat and other protein foods that are low in fat  Choose beans or other legumes such as split peas or lentils  Choose fish, skinless poultry (chicken or turkey), or lean cuts of red meat (beef or pork)  Before you cook meat or poultry, cut off any visible fat  · Use less fat and oil  Try baking foods instead of frying them  Add less fat, such as margarine, sour cream, regular salad dressing and mayonnaise to foods  Eat fewer high-fat foods  Some examples of high-fat foods include french fries, doughnuts, ice cream, and cakes  · Eat fewer sweets  Limit foods and drinks that are high in sugar  This includes candy, cookies, regular soda, and sweetened drinks  Ways to decrease calories:   · Eat smaller portions  ¨ Use a small plate with smaller servings  ¨ Do not eat second helpings  ¨ When you eat at a restaurant, ask for a box and place half of your meal in the box before you eat  ¨ Share an entrée with someone else  · Replace high-calorie snacks with healthy, low-calorie snacks  ¨ Choose fresh fruit, vegetables, fat-free rice cakes, or air-popped popcorn instead of potato chips, nuts, or chocolate  ¨ Choose water or calorie-free drinks instead of soda or sweetened drinks  · Eat regular meals  Skipping meals can lead to overeating later in the day   Eat a healthy snack in place of a meal if you do not have time to eat a regular meal      · Do not shop for groceries when you are hungry  You may be more likely to make unhealthy food choices  Take a grocery list of healthy foods and shop after you have eaten  Exercise:  Exercise at least 30 minutes per day on most days of the week  Some examples of exercise include walking, biking, dancing, and swimming  You can also fit in more physical activity by taking the stairs instead of the elevator or parking farther away from stores  Ask your healthcare provider about the best exercise plan for you  Other things to consider as you try to lose weight:   · Be aware of situations that may give you the urge to overeat, such as eating while watching television  Find ways to avoid these situations  For example, read a book, go for a walk, or do crafts  · Meet with a weight loss support group or friends who are also trying to lose weight  This may help you stay motivated to continue working on your weight loss goals  © 2017 [address] Information is for End User's use only and may not be sold, redistributed or otherwise used for commercial purposes  All illustrations and images included in CareNotes® are the copyrighted property of A D A M , Inc  or Gary Jarvis  The above information is an  only  It is not intended as medical advice for individual conditions or treatments  Talk to your doctor, nurse or pharmacist before following any medical regimen to see if it is safe and effective for you  No follow-ups on file  Subjective:      Patient ID: Ed Dowd is a 50 y o  male  Chief Complaint   Patient presents with    Urinary Tract Infection     pt states he is having stinging with urination and a pain in his lower back    jmcma       Pt is here thinks he may have a UTI  Pt states a number of months back he had blood in his urine  Few months after he had pain in his back - was placed on  Meloxicam, helped    Pt states he has pain in his back, his meloxicam has has run out on the meloxicam his knees were better as was his back  Would like a refill of this    Pt states also for a while he has a burning sensation in his urine  Pt states he has an appt with Dr Digna Cleveland in a month  Pt states he has burning all there time and states his urine seems to be leaking  This has been a month maybe more as well  Pt states he also has tenderness in his scrotal area  Farnaz Du has been a month maybe more    Urology wanted to do a scope but that did not happemn yet he will be sched to do  The following portions of the patient's history were reviewed and updated as appropriate: allergies, current medications, past family history, past medical history, past social history, past surgical history and problem list     Review of Systems   Constitutional: Negative for activity change, appetite change, chills, diaphoresis, fatigue, fever and unexpected weight change  HENT: Negative for congestion, dental problem, ear pain, mouth sores, sinus pressure, sinus pain, sore throat and trouble swallowing  Eyes: Negative for photophobia, discharge and itching  Respiratory: Negative for apnea, chest tightness and shortness of breath  Cardiovascular: Negative for chest pain, palpitations and leg swelling  Gastrointestinal: Negative for abdominal distention, abdominal pain, blood in stool, nausea and vomiting  Endocrine: Negative for cold intolerance, heat intolerance, polydipsia, polyphagia and polyuria  Genitourinary: Positive for dysuria  Negative for difficulty urinating and discharge  Musculoskeletal: Positive for arthralgias  Skin: Positive for wound  Negative for color change  Neurological: Negative for dizziness, syncope, speech difficulty and headaches  Hematological: Negative for adenopathy  Psychiatric/Behavioral: Negative for agitation and behavioral problems           Current Outpatient Medications   Medication Sig Dispense Refill    allopurinol (ZYLOPRIM) 100 mg tablet Take 1 tablet (100 mg total) by mouth daily 90 tablet 1    APPLE CIDER VINEGAR PO Take by mouth      colchicine (COLCRYS) 0 6 mg tablet Take 1 tablet (0 6 mg total) by mouth daily 12 tablet 1    diclofenac sodium (VOLTAREN) 1 % Apply 4 g topically 4 (four) times a day for 90 days 1440 g 0    olmesartan (BENICAR) 40 mg tablet Take 1 tablet (40 mg total) by mouth daily 90 tablet 1    allopurinol (ZYLOPRIM) 100 mg tablet Take 1 tablet (100 mg total) by mouth daily for 30 days (Patient not taking: Reported on 3/4/2019) 90 tablet 1    meloxicam (MOBIC) 15 mg tablet Take 1 tablet (15 mg total) by mouth daily for 30 days As needed 30 tablet 5     No current facility-administered medications for this visit  Objective:    /82   Pulse 80   Temp 97 9 °F (36 6 °C)   Resp 16   Ht 6' (1 829 m)   Wt (!) 184 kg (405 lb) Comment: per patient  BMI 54 93 kg/m²        Physical Exam   Constitutional: He appears well-developed and well-nourished  No distress  HENT:   Head: Normocephalic and atraumatic  Right Ear: External ear normal    Left Ear: External ear normal    Nose: Nose normal    Mouth/Throat: Oropharynx is clear and moist  No oropharyngeal exudate  Eyes: Pupils are equal, round, and reactive to light  EOM are normal  Right eye exhibits no discharge  Left eye exhibits no discharge  No scleral icterus  Neck: No thyromegaly present  Cardiovascular: Normal rate and normal heart sounds  No murmur heard  Pulmonary/Chest: Effort normal and breath sounds normal  No respiratory distress  He has no wheezes  Abdominal: Soft  Bowel sounds are normal  He exhibits no distension and no mass  There is no tenderness  There is no rebound and no guarding  Musculoskeletal: Normal range of motion  Neurological: He is alert  He displays normal reflexes  Coordination normal    Skin: Skin is warm and dry  No rash noted  He is not diaphoretic  No erythema  Psychiatric: He has a normal mood and affect  His behavior is normal    Nursing note and vitals reviewed  Kyle Meza DO  BMI Counseling: Body mass index is 54 93 kg/m²  Discussed the patient's BMI with him  The BMI is above average  BMI counseling and education was provided to the patient  Nutrition recommendations include reducing portion sizes

## 2019-03-04 NOTE — ASSESSMENT & PLAN NOTE
Pt advised on moving up his appt with urology as his scrotal lesions will get worse the more he is leaking    He is not doing his keagles as mucha s he was and he should be

## 2019-03-06 ENCOUNTER — TELEPHONE (OUTPATIENT)
Dept: FAMILY MEDICINE CLINIC | Facility: CLINIC | Age: 49
End: 2019-03-06

## 2019-03-06 LAB
BACTERIA UR CULT: NORMAL
Lab: NORMAL

## 2019-03-07 NOTE — TELEPHONE ENCOUNTER
Called pt to discuss results  The culture results were minimal - they did grow out some bacteria but not enough to be considered sig  I wanted to see if he felt better  I think at the end of the day he was going to need to see Urology again

## 2019-03-08 NOTE — TELEPHONE ENCOUNTER
spoke to pt aware of previous mentioned,  States no improvement but not getting worse  patient also stated feels like its to early to tell  Made patient aware to call our office if symptoms become worse  no further action is required  af/rma

## 2019-04-03 ENCOUNTER — TELEPHONE (OUTPATIENT)
Dept: FAMILY MEDICINE CLINIC | Facility: CLINIC | Age: 49
End: 2019-04-03

## 2019-04-03 DIAGNOSIS — I10 BENIGN ESSENTIAL HYPERTENSION: ICD-10-CM

## 2019-04-03 DIAGNOSIS — R10.9 FLANK PAIN: ICD-10-CM

## 2019-04-03 DIAGNOSIS — M10.9 GOUT, UNSPECIFIED CAUSE, UNSPECIFIED CHRONICITY, UNSPECIFIED SITE: ICD-10-CM

## 2019-04-03 RX ORDER — OLMESARTAN MEDOXOMIL 40 MG/1
40 TABLET ORAL DAILY
Qty: 90 TABLET | Refills: 3 | Status: SHIPPED | OUTPATIENT
Start: 2019-04-03 | End: 2019-12-02 | Stop reason: SDUPTHER

## 2019-04-03 RX ORDER — ALLOPURINOL 100 MG/1
100 TABLET ORAL DAILY
Qty: 90 TABLET | Refills: 3 | Status: SHIPPED | OUTPATIENT
Start: 2019-04-03 | End: 2019-12-29 | Stop reason: SDUPTHER

## 2019-04-03 RX ORDER — MELOXICAM 15 MG/1
15 TABLET ORAL DAILY
Qty: 30 TABLET | Refills: 3 | Status: SHIPPED | OUTPATIENT
Start: 2019-04-03 | End: 2019-05-05 | Stop reason: SDUPTHER

## 2019-04-04 DIAGNOSIS — I10 BENIGN ESSENTIAL HYPERTENSION: ICD-10-CM

## 2019-04-04 RX ORDER — OLMESARTAN MEDOXOMIL 40 MG/1
TABLET ORAL
Qty: 90 TABLET | Refills: 1 | Status: SHIPPED | OUTPATIENT
Start: 2019-04-04 | End: 2019-11-30 | Stop reason: SDUPTHER

## 2019-05-05 DIAGNOSIS — N50.89 SCROTAL ULCER: ICD-10-CM

## 2019-05-05 DIAGNOSIS — R10.9 FLANK PAIN: ICD-10-CM

## 2019-05-06 RX ORDER — MELOXICAM 15 MG/1
15 TABLET ORAL DAILY
Qty: 30 TABLET | Refills: 0 | Status: SHIPPED | OUTPATIENT
Start: 2019-05-06 | End: 2019-06-12 | Stop reason: SDUPTHER

## 2019-05-06 RX ORDER — NYSTATIN 100000 [USP'U]/G
POWDER TOPICAL 3 TIMES DAILY
Qty: 60 G | Refills: 0 | Status: SHIPPED | OUTPATIENT
Start: 2019-05-06 | End: 2020-05-12 | Stop reason: ALTCHOICE

## 2019-06-12 DIAGNOSIS — R10.9 FLANK PAIN: ICD-10-CM

## 2019-06-12 RX ORDER — MELOXICAM 15 MG/1
15 TABLET ORAL DAILY
Qty: 30 TABLET | Refills: 0 | Status: SHIPPED | OUTPATIENT
Start: 2019-06-12 | End: 2020-05-12

## 2019-06-22 DIAGNOSIS — M10.9 GOUT, UNSPECIFIED CAUSE, UNSPECIFIED CHRONICITY, UNSPECIFIED SITE: ICD-10-CM

## 2019-06-24 RX ORDER — COLCHICINE 0.6 MG/1
TABLET ORAL
Qty: 12 TABLET | Refills: 1 | Status: SHIPPED | OUTPATIENT
Start: 2019-06-24 | End: 2020-02-24

## 2019-08-22 DIAGNOSIS — R10.9 FLANK PAIN: ICD-10-CM

## 2019-08-22 RX ORDER — MELOXICAM 15 MG/1
TABLET ORAL
Qty: 30 TABLET | Refills: 3 | Status: SHIPPED | OUTPATIENT
Start: 2019-08-22 | End: 2019-11-01 | Stop reason: SDUPTHER

## 2019-11-01 DIAGNOSIS — R10.9 FLANK PAIN: ICD-10-CM

## 2019-11-01 RX ORDER — MELOXICAM 15 MG/1
15 TABLET ORAL DAILY PRN
Qty: 30 TABLET | Refills: 0 | Status: SHIPPED | OUTPATIENT
Start: 2019-11-01 | End: 2019-11-30 | Stop reason: SDUPTHER

## 2019-11-30 DIAGNOSIS — I10 BENIGN ESSENTIAL HYPERTENSION: ICD-10-CM

## 2019-11-30 DIAGNOSIS — R10.9 FLANK PAIN: ICD-10-CM

## 2019-12-02 RX ORDER — MELOXICAM 15 MG/1
15 TABLET ORAL DAILY PRN
Qty: 30 TABLET | Refills: 0 | Status: SHIPPED | OUTPATIENT
Start: 2019-12-02 | End: 2019-12-29 | Stop reason: SDUPTHER

## 2019-12-02 RX ORDER — OLMESARTAN MEDOXOMIL 40 MG/1
40 TABLET ORAL DAILY
Qty: 90 TABLET | Refills: 0 | Status: SHIPPED | OUTPATIENT
Start: 2019-12-02 | End: 2020-05-26

## 2019-12-02 NOTE — TELEPHONE ENCOUNTER
I refilled the patient's medication, but they are due for an appointment  Please ask them to schedule     Thank you,  Chantel Alvarado, DO

## 2019-12-28 DIAGNOSIS — R10.9 FLANK PAIN: ICD-10-CM

## 2019-12-29 DIAGNOSIS — R10.9 FLANK PAIN: ICD-10-CM

## 2019-12-29 DIAGNOSIS — M10.9 GOUT, UNSPECIFIED CAUSE, UNSPECIFIED CHRONICITY, UNSPECIFIED SITE: ICD-10-CM

## 2019-12-30 RX ORDER — ALLOPURINOL 100 MG/1
100 TABLET ORAL DAILY
Qty: 90 TABLET | Refills: 0 | Status: SHIPPED | OUTPATIENT
Start: 2019-12-30 | End: 2020-05-26 | Stop reason: SDUPTHER

## 2019-12-30 RX ORDER — MELOXICAM 15 MG/1
15 TABLET ORAL DAILY PRN
Qty: 30 TABLET | Refills: 0 | Status: SHIPPED | OUTPATIENT
Start: 2019-12-30 | End: 2020-03-05 | Stop reason: SDUPTHER

## 2019-12-30 RX ORDER — MELOXICAM 15 MG/1
15 TABLET ORAL DAILY PRN
Qty: 30 TABLET | Refills: 0 | Status: SHIPPED | OUTPATIENT
Start: 2019-12-30 | End: 2020-05-12 | Stop reason: SDUPTHER

## 2019-12-30 RX ORDER — MELOXICAM 15 MG/1
TABLET ORAL
Qty: 30 TABLET | Refills: 3 | Status: SHIPPED | OUTPATIENT
Start: 2019-12-30 | End: 2020-05-12 | Stop reason: SDUPTHER

## 2019-12-30 RX ORDER — ALLOPURINOL 100 MG/1
100 TABLET ORAL DAILY
Qty: 90 TABLET | Refills: 0 | Status: SHIPPED | OUTPATIENT
Start: 2019-12-30 | End: 2020-03-30 | Stop reason: SDUPTHER

## 2020-02-11 ENCOUNTER — TELEPHONE (OUTPATIENT)
Dept: FAMILY MEDICINE CLINIC | Facility: CLINIC | Age: 50
End: 2020-02-11

## 2020-02-11 DIAGNOSIS — Z20.828 EXPOSURE TO THE FLU: Primary | ICD-10-CM

## 2020-02-11 RX ORDER — OSELTAMIVIR PHOSPHATE 75 MG/1
75 CAPSULE ORAL DAILY
Qty: 10 CAPSULE | Refills: 0 | Status: SHIPPED | OUTPATIENT
Start: 2020-02-11 | End: 2020-02-21

## 2020-02-11 NOTE — TELEPHONE ENCOUNTER
Wait was he diagnosed with the flu I feel that we wrote him a prophylactic dose for exposure  Was he exposed or has he been diagnosed?   Unless the mom has the flu being exposed to the students through the mom would not count as exposure

## 2020-02-11 NOTE — TELEPHONE ENCOUNTER
Patient is requesting tamiflu  Wife is teacher and her whole class has flu  Son also home today with the flu    [employer] in [place]

## 2020-02-22 DIAGNOSIS — M10.9 GOUT, UNSPECIFIED CAUSE, UNSPECIFIED CHRONICITY, UNSPECIFIED SITE: ICD-10-CM

## 2020-02-24 RX ORDER — COLCHICINE 0.6 MG/1
TABLET ORAL
Qty: 12 TABLET | Refills: 1 | Status: SHIPPED | OUTPATIENT
Start: 2020-02-24 | End: 2020-03-05 | Stop reason: SDUPTHER

## 2020-03-05 DIAGNOSIS — M10.9 GOUT, UNSPECIFIED CAUSE, UNSPECIFIED CHRONICITY, UNSPECIFIED SITE: ICD-10-CM

## 2020-03-05 DIAGNOSIS — R10.9 FLANK PAIN: ICD-10-CM

## 2020-03-06 RX ORDER — MELOXICAM 15 MG/1
15 TABLET ORAL DAILY PRN
Qty: 30 TABLET | Refills: 0 | Status: SHIPPED | OUTPATIENT
Start: 2020-03-06 | End: 2020-03-30 | Stop reason: SDUPTHER

## 2020-03-06 RX ORDER — COLCHICINE 0.6 MG/1
0.6 TABLET ORAL DAILY
Qty: 12 TABLET | Refills: 0 | Status: SHIPPED | OUTPATIENT
Start: 2020-03-06 | End: 2020-05-10 | Stop reason: SDUPTHER

## 2020-03-30 DIAGNOSIS — M10.9 GOUT, UNSPECIFIED CAUSE, UNSPECIFIED CHRONICITY, UNSPECIFIED SITE: ICD-10-CM

## 2020-03-30 DIAGNOSIS — R10.9 FLANK PAIN: ICD-10-CM

## 2020-03-30 RX ORDER — ALLOPURINOL 100 MG/1
TABLET ORAL
Qty: 90 TABLET | Refills: 0 | Status: SHIPPED | OUTPATIENT
Start: 2020-03-30 | End: 2020-05-12 | Stop reason: SDUPTHER

## 2020-03-30 RX ORDER — ALLOPURINOL 100 MG/1
100 TABLET ORAL DAILY
Qty: 90 TABLET | Refills: 0 | Status: SHIPPED | OUTPATIENT
Start: 2020-03-30 | End: 2020-05-12 | Stop reason: SDUPTHER

## 2020-03-30 RX ORDER — MELOXICAM 15 MG/1
15 TABLET ORAL DAILY PRN
Qty: 30 TABLET | Refills: 0 | Status: SHIPPED | OUTPATIENT
Start: 2020-03-30 | End: 2020-05-10 | Stop reason: SDUPTHER

## 2020-05-09 DIAGNOSIS — R10.9 FLANK PAIN: ICD-10-CM

## 2020-05-09 RX ORDER — MELOXICAM 15 MG/1
TABLET ORAL
Qty: 30 TABLET | Refills: 0 | OUTPATIENT
Start: 2020-05-09

## 2020-05-10 DIAGNOSIS — R10.9 FLANK PAIN: ICD-10-CM

## 2020-05-10 DIAGNOSIS — M10.9 GOUT, UNSPECIFIED CAUSE, UNSPECIFIED CHRONICITY, UNSPECIFIED SITE: ICD-10-CM

## 2020-05-11 RX ORDER — MELOXICAM 15 MG/1
15 TABLET ORAL DAILY PRN
Qty: 30 TABLET | Refills: 0 | Status: SHIPPED | OUTPATIENT
Start: 2020-05-11 | End: 2020-05-12 | Stop reason: SDUPTHER

## 2020-05-11 RX ORDER — COLCHICINE 0.6 MG/1
0.6 TABLET ORAL DAILY
Qty: 12 TABLET | Refills: 0 | Status: SHIPPED | OUTPATIENT
Start: 2020-05-11 | End: 2020-08-31

## 2020-05-12 ENCOUNTER — TELEMEDICINE (OUTPATIENT)
Dept: FAMILY MEDICINE CLINIC | Facility: CLINIC | Age: 50
End: 2020-05-12
Payer: COMMERCIAL

## 2020-05-12 VITALS
WEIGHT: 315 LBS | BODY MASS INDEX: 42.66 KG/M2 | HEIGHT: 72 IN | SYSTOLIC BLOOD PRESSURE: 120 MMHG | DIASTOLIC BLOOD PRESSURE: 80 MMHG

## 2020-05-12 DIAGNOSIS — R73.09 ABNORMAL GLUCOSE: ICD-10-CM

## 2020-05-12 DIAGNOSIS — R74.8 ELEVATED LIVER ENZYMES: ICD-10-CM

## 2020-05-12 DIAGNOSIS — I10 BENIGN ESSENTIAL HYPERTENSION: ICD-10-CM

## 2020-05-12 DIAGNOSIS — Z11.4 SCREENING FOR HIV (HUMAN IMMUNODEFICIENCY VIRUS): ICD-10-CM

## 2020-05-12 DIAGNOSIS — R10.9 FLANK PAIN: Primary | ICD-10-CM

## 2020-05-12 DIAGNOSIS — C61 ADENOCARCINOMA OF PROSTATE (HCC): ICD-10-CM

## 2020-05-12 DIAGNOSIS — E66.01 MORBID OBESITY WITH BMI OF 50.0-59.9, ADULT (HCC): ICD-10-CM

## 2020-05-12 PROCEDURE — 3074F SYST BP LT 130 MM HG: CPT | Performed by: FAMILY MEDICINE

## 2020-05-12 PROCEDURE — 3008F BODY MASS INDEX DOCD: CPT | Performed by: FAMILY MEDICINE

## 2020-05-12 PROCEDURE — 99214 OFFICE O/P EST MOD 30 MIN: CPT | Performed by: FAMILY MEDICINE

## 2020-05-12 PROCEDURE — 3079F DIAST BP 80-89 MM HG: CPT | Performed by: FAMILY MEDICINE

## 2020-05-12 RX ORDER — MELOXICAM 15 MG/1
15 TABLET ORAL DAILY PRN
Qty: 30 TABLET | Refills: 3 | Status: SHIPPED | OUTPATIENT
Start: 2020-05-12 | End: 2020-05-12 | Stop reason: SDUPTHER

## 2020-05-12 RX ORDER — MELOXICAM 15 MG/1
15 TABLET ORAL DAILY PRN
Qty: 30 TABLET | Refills: 3 | Status: SHIPPED | OUTPATIENT
Start: 2020-05-12 | End: 2020-06-11

## 2020-05-26 DIAGNOSIS — I10 BENIGN ESSENTIAL HYPERTENSION: ICD-10-CM

## 2020-05-26 DIAGNOSIS — M10.9 GOUT, UNSPECIFIED CAUSE, UNSPECIFIED CHRONICITY, UNSPECIFIED SITE: ICD-10-CM

## 2020-05-26 RX ORDER — OLMESARTAN MEDOXOMIL 40 MG/1
TABLET ORAL
Qty: 90 TABLET | Refills: 1 | Status: SHIPPED | OUTPATIENT
Start: 2020-05-26 | End: 2020-05-26 | Stop reason: SDUPTHER

## 2020-05-26 RX ORDER — OLMESARTAN MEDOXOMIL 40 MG/1
40 TABLET ORAL DAILY
Qty: 90 TABLET | Refills: 0 | Status: SHIPPED | OUTPATIENT
Start: 2020-05-26 | End: 2020-11-25 | Stop reason: SDUPTHER

## 2020-05-26 RX ORDER — ALLOPURINOL 100 MG/1
100 TABLET ORAL DAILY
Qty: 90 TABLET | Refills: 0 | Status: SHIPPED | OUTPATIENT
Start: 2020-05-26 | End: 2020-09-25 | Stop reason: SDUPTHER

## 2020-06-11 DIAGNOSIS — R10.9 FLANK PAIN: ICD-10-CM

## 2020-06-11 RX ORDER — MELOXICAM 15 MG/1
TABLET ORAL
Qty: 30 TABLET | Refills: 0 | Status: SHIPPED | OUTPATIENT
Start: 2020-06-11 | End: 2020-09-25 | Stop reason: SDUPTHER

## 2020-08-31 DIAGNOSIS — M10.9 GOUT, UNSPECIFIED CAUSE, UNSPECIFIED CHRONICITY, UNSPECIFIED SITE: ICD-10-CM

## 2020-08-31 RX ORDER — COLCHICINE 0.6 MG/1
TABLET ORAL
Qty: 12 TABLET | Refills: 0 | Status: SHIPPED | OUTPATIENT
Start: 2020-08-31 | End: 2021-01-07 | Stop reason: SDUPTHER

## 2020-09-25 DIAGNOSIS — M10.9 GOUT, UNSPECIFIED CAUSE, UNSPECIFIED CHRONICITY, UNSPECIFIED SITE: ICD-10-CM

## 2020-09-25 DIAGNOSIS — R10.9 FLANK PAIN: ICD-10-CM

## 2020-09-25 RX ORDER — ALLOPURINOL 100 MG/1
100 TABLET ORAL DAILY
Qty: 90 TABLET | Refills: 0 | Status: SHIPPED | OUTPATIENT
Start: 2020-09-25 | End: 2020-12-31 | Stop reason: SDUPTHER

## 2020-09-25 RX ORDER — MELOXICAM 15 MG/1
15 TABLET ORAL DAILY
Qty: 30 TABLET | Refills: 0 | Status: SHIPPED | OUTPATIENT
Start: 2020-09-25 | End: 2020-10-29 | Stop reason: SDUPTHER

## 2020-10-29 DIAGNOSIS — R10.9 FLANK PAIN: ICD-10-CM

## 2020-10-30 RX ORDER — MELOXICAM 15 MG/1
15 TABLET ORAL DAILY
Qty: 30 TABLET | Refills: 0 | Status: SHIPPED | OUTPATIENT
Start: 2020-10-30 | End: 2020-11-27 | Stop reason: SDUPTHER

## 2020-11-03 ENCOUNTER — OFFICE VISIT (OUTPATIENT)
Dept: PULMONOLOGY | Facility: MEDICAL CENTER | Age: 50
End: 2020-11-03
Payer: COMMERCIAL

## 2020-11-03 VITALS
DIASTOLIC BLOOD PRESSURE: 88 MMHG | OXYGEN SATURATION: 97 % | TEMPERATURE: 98 F | WEIGHT: 315 LBS | RESPIRATION RATE: 12 BRPM | HEART RATE: 91 BPM | SYSTOLIC BLOOD PRESSURE: 130 MMHG | HEIGHT: 72 IN | BODY MASS INDEX: 42.66 KG/M2

## 2020-11-03 DIAGNOSIS — G47.33 OBSTRUCTIVE SLEEP APNEA: Primary | ICD-10-CM

## 2020-11-03 PROCEDURE — 3075F SYST BP GE 130 - 139MM HG: CPT | Performed by: INTERNAL MEDICINE

## 2020-11-03 PROCEDURE — 3079F DIAST BP 80-89 MM HG: CPT | Performed by: INTERNAL MEDICINE

## 2020-11-03 PROCEDURE — 1036F TOBACCO NON-USER: CPT | Performed by: INTERNAL MEDICINE

## 2020-11-03 PROCEDURE — 99213 OFFICE O/P EST LOW 20 MIN: CPT | Performed by: INTERNAL MEDICINE

## 2020-11-03 PROCEDURE — 3008F BODY MASS INDEX DOCD: CPT | Performed by: INTERNAL MEDICINE

## 2020-11-25 DIAGNOSIS — I10 BENIGN ESSENTIAL HYPERTENSION: ICD-10-CM

## 2020-11-27 DIAGNOSIS — R10.9 FLANK PAIN: ICD-10-CM

## 2020-11-27 DIAGNOSIS — I10 BENIGN ESSENTIAL HYPERTENSION: ICD-10-CM

## 2020-12-01 RX ORDER — OLMESARTAN MEDOXOMIL 40 MG/1
40 TABLET ORAL DAILY
Qty: 90 TABLET | Refills: 0 | Status: SHIPPED | OUTPATIENT
Start: 2020-12-01 | End: 2020-12-02 | Stop reason: SDUPTHER

## 2020-12-01 RX ORDER — OLMESARTAN MEDOXOMIL 40 MG/1
40 TABLET ORAL DAILY
Qty: 90 TABLET | Refills: 0 | Status: SHIPPED | OUTPATIENT
Start: 2020-12-01 | End: 2020-12-02

## 2020-12-01 RX ORDER — MELOXICAM 15 MG/1
15 TABLET ORAL DAILY
Qty: 30 TABLET | Refills: 0 | Status: SHIPPED | OUTPATIENT
Start: 2020-12-01 | End: 2020-12-31 | Stop reason: SDUPTHER

## 2020-12-02 ENCOUNTER — TELEMEDICINE (OUTPATIENT)
Dept: FAMILY MEDICINE CLINIC | Facility: CLINIC | Age: 50
End: 2020-12-02
Payer: COMMERCIAL

## 2020-12-02 VITALS — SYSTOLIC BLOOD PRESSURE: 135 MMHG | DIASTOLIC BLOOD PRESSURE: 75 MMHG

## 2020-12-02 DIAGNOSIS — I10 BENIGN ESSENTIAL HYPERTENSION: Primary | ICD-10-CM

## 2020-12-02 PROCEDURE — 3078F DIAST BP <80 MM HG: CPT | Performed by: FAMILY MEDICINE

## 2020-12-02 PROCEDURE — 99213 OFFICE O/P EST LOW 20 MIN: CPT | Performed by: FAMILY MEDICINE

## 2020-12-02 PROCEDURE — 1036F TOBACCO NON-USER: CPT | Performed by: FAMILY MEDICINE

## 2020-12-02 PROCEDURE — 3075F SYST BP GE 130 - 139MM HG: CPT | Performed by: FAMILY MEDICINE

## 2020-12-02 RX ORDER — OLMESARTAN MEDOXOMIL 40 MG/1
40 TABLET ORAL DAILY
Qty: 90 TABLET | Refills: 1 | Status: SHIPPED | OUTPATIENT
Start: 2020-12-02 | End: 2021-08-27

## 2020-12-23 LAB
ALBUMIN SERPL-MCNC: 4.6 G/DL (ref 4–5)
ALBUMIN/GLOB SERPL: 1.6 {RATIO} (ref 1.2–2.2)
ALP SERPL-CCNC: 84 IU/L (ref 39–117)
ALT SERPL-CCNC: 25 IU/L (ref 0–44)
AST SERPL-CCNC: 19 IU/L (ref 0–40)
BILIRUB SERPL-MCNC: 0.3 MG/DL (ref 0–1.2)
BUN SERPL-MCNC: 24 MG/DL (ref 6–24)
BUN/CREAT SERPL: 27 (ref 9–20)
CALCIUM SERPL-MCNC: 9.5 MG/DL (ref 8.7–10.2)
CHLORIDE SERPL-SCNC: 107 MMOL/L (ref 96–106)
CHOLEST SERPL-MCNC: 175 MG/DL (ref 100–199)
CO2 SERPL-SCNC: 22 MMOL/L (ref 20–29)
CREAT SERPL-MCNC: 0.88 MG/DL (ref 0.76–1.27)
GLOBULIN SER-MCNC: 2.8 G/DL (ref 1.5–4.5)
GLUCOSE SERPL-MCNC: 110 MG/DL (ref 65–99)
HBA1C MFR BLD: 5.7 % (ref 4.8–5.6)
HDLC SERPL-MCNC: 57 MG/DL
HIV 1+2 AB+HIV1 P24 AG SERPL QL IA: NON REACTIVE
LDLC SERPL CALC-MCNC: 107 MG/DL (ref 0–99)
MICRODELETION SYND BLD/T FISH: NORMAL
POTASSIUM SERPL-SCNC: 4.7 MMOL/L (ref 3.5–5.2)
PROT SERPL-MCNC: 7.4 G/DL (ref 6–8.5)
PSA SERPL-MCNC: <0.1 NG/ML (ref 0–4)
SL AMB EGFR AFRICAN AMERICAN: 116 ML/MIN/1.73
SL AMB EGFR NON AFRICAN AMERICAN: 100 ML/MIN/1.73
SODIUM SERPL-SCNC: 141 MMOL/L (ref 134–144)
TRIGL SERPL-MCNC: 56 MG/DL (ref 0–149)

## 2020-12-31 DIAGNOSIS — M10.9 GOUT, UNSPECIFIED CAUSE, UNSPECIFIED CHRONICITY, UNSPECIFIED SITE: ICD-10-CM

## 2020-12-31 DIAGNOSIS — R10.9 FLANK PAIN: ICD-10-CM

## 2020-12-31 RX ORDER — ALLOPURINOL 100 MG/1
100 TABLET ORAL DAILY
Qty: 90 TABLET | Refills: 0 | Status: SHIPPED | OUTPATIENT
Start: 2020-12-31 | End: 2021-03-23

## 2020-12-31 RX ORDER — MELOXICAM 15 MG/1
15 TABLET ORAL DAILY
Qty: 30 TABLET | Refills: 0 | Status: SHIPPED | OUTPATIENT
Start: 2020-12-31 | End: 2021-01-27

## 2021-01-07 DIAGNOSIS — M10.9 GOUT, UNSPECIFIED CAUSE, UNSPECIFIED CHRONICITY, UNSPECIFIED SITE: ICD-10-CM

## 2021-01-07 RX ORDER — COLCHICINE 0.6 MG/1
0.6 TABLET ORAL DAILY
Qty: 12 TABLET | Refills: 0 | Status: SHIPPED | OUTPATIENT
Start: 2021-01-07 | End: 2021-02-10 | Stop reason: SDUPTHER

## 2021-01-27 DIAGNOSIS — R10.9 FLANK PAIN: ICD-10-CM

## 2021-01-27 RX ORDER — MELOXICAM 15 MG/1
TABLET ORAL
Qty: 30 TABLET | Refills: 0 | Status: SHIPPED | OUTPATIENT
Start: 2021-01-27 | End: 2021-02-23 | Stop reason: SDUPTHER

## 2021-02-03 ENCOUNTER — TELEPHONE (OUTPATIENT)
Dept: BARIATRICS | Facility: CLINIC | Age: 51
End: 2021-02-03

## 2021-02-03 ENCOUNTER — APPOINTMENT (OUTPATIENT)
Dept: RADIOLOGY | Facility: CLINIC | Age: 51
End: 2021-02-03
Payer: COMMERCIAL

## 2021-02-03 ENCOUNTER — OFFICE VISIT (OUTPATIENT)
Dept: OBGYN CLINIC | Facility: CLINIC | Age: 51
End: 2021-02-03
Payer: COMMERCIAL

## 2021-02-03 VITALS
DIASTOLIC BLOOD PRESSURE: 77 MMHG | BODY MASS INDEX: 42.66 KG/M2 | WEIGHT: 315 LBS | SYSTOLIC BLOOD PRESSURE: 150 MMHG | HEART RATE: 90 BPM | HEIGHT: 72 IN

## 2021-02-03 DIAGNOSIS — M19.079 ANKLE ARTHRITIS: ICD-10-CM

## 2021-02-03 DIAGNOSIS — M25.571 PAIN, JOINT, ANKLE AND FOOT, RIGHT: ICD-10-CM

## 2021-02-03 DIAGNOSIS — M25.571 PAIN, JOINT, ANKLE AND FOOT, RIGHT: Primary | ICD-10-CM

## 2021-02-03 DIAGNOSIS — I87.2 CHRONIC VENOUS STASIS DERMATITIS OF LEFT LOWER EXTREMITY: ICD-10-CM

## 2021-02-03 DIAGNOSIS — E66.01 CLASS 3 SEVERE OBESITY DUE TO EXCESS CALORIES WITH BODY MASS INDEX (BMI) OF 50.0 TO 59.9 IN ADULT, UNSPECIFIED WHETHER SERIOUS COMORBIDITY PRESENT (HCC): ICD-10-CM

## 2021-02-03 DIAGNOSIS — I87.2 VENOUS INSUFFICIENCY OF LEFT LEG: ICD-10-CM

## 2021-02-03 PROCEDURE — 73610 X-RAY EXAM OF ANKLE: CPT

## 2021-02-03 PROCEDURE — 99204 OFFICE O/P NEW MOD 45 MIN: CPT | Performed by: ORTHOPAEDIC SURGERY

## 2021-02-03 NOTE — PROGRESS NOTES
Assessment/Plan:  1  Pain, joint, ankle and foot, right  XR ankle 3+ vw left   2  Chronic venous stasis dermatitis of left lower extremity  Ambulatory referral to Vascular Surgery   3  Venous insufficiency of left leg  Ambulatory referral to Vascular Surgery   4  Class 3 severe obesity due to excess calories with body mass index (BMI) of 50 0 to 59 9 in adult, unspecified whether serious comorbidity present [facility])  Ambulatory referral to Vascular Surgery    Ambulatory referral to Weight Management   5  Ankle arthritis         Scribe Attestation    I,:  Jesus Holland Call am acting as a scribe while in the presence of the attending physician :       I,:  Sandra Iqbal MD personally performed the services described in this documentation    as scribed in my presence :             I am very concerned for Glenn's overall health  He is morbidly obese which has lead to venous stasis as well as venous insufficiency about the left lower extremity  We had a lengthy conversation in regards to his obesity and how this is affecting his overall health  This venous stasis with skin changes are warning signs that more significant health issues could begin to arise due to his morbid obesity  I have referred him to weight management for their services and truly feel they will be a great benefit to him  I have also referred him to vascular surgery for evaluation of his left lower extremity venous insufficiency  The ankle only has mild arthritic changes  I feel his ankle pain will improve with weight loss as well  He can follow up with me as needed  Subjective:   Mikki Martins is a 48 y o  male who presents to the office today for evaluation of his left lower leg swelling and ankle pain  Tomeka Ravi states he has had a history of swelling in his left lower extremity for several years but it appears to be worsening  He states approximately 10 years ago he did receive in vascular ultrasound which diagnosed him with a, Angelia Devoid  He is concerned because he now has skin color changes  He is also concerned because he injured a toe nail in June and it still has not completely healed  Zia Gamble states his complaint of ankle pain occurs mainly from long walks or prolonged standing  That intermittent pain is located about the lateral aspect of the right ankle and will radiate anteriorly and at times posteriorly  He does have a history of a ankle injury in which he suffered when he was 12years old  Pertinent history includes a history of gout  He denies a history of diabetes though his doctor states he has been told he is prediabetic  Review of Systems   Constitutional: Negative for chills, fever and unexpected weight change  HENT: Negative for hearing loss, nosebleeds and sore throat  Eyes: Negative for pain, redness and visual disturbance  Respiratory: Negative for cough, shortness of breath and wheezing  Cardiovascular: Negative for chest pain, palpitations and leg swelling  Gastrointestinal: Negative for abdominal pain, nausea and vomiting  Endocrine: Negative for polyphagia and polyuria  Genitourinary: Negative for dysuria and hematuria  Musculoskeletal:        See HPI   Skin: Negative for rash and wound  Neurological: Negative for dizziness, numbness and headaches  Psychiatric/Behavioral: Negative for decreased concentration and suicidal ideas  The patient is not nervous/anxious            Past Medical History:   Diagnosis Date    Arthritis     Arthropathy 04/06/2011    Last assessed    Fingertip amputation     Gout     Hypertension     Hypotension     resulting from Keto diet per pt    Obesity     Pneumonia     Prostate cancer ([address] )     Seizures [facility]     Sleep apnea     Tachycardia 03/03/2008    Last assessed    Varicose veins of lower extremity        Past Surgical History:   Procedure Laterality Date    PROSTATECTOMY      SHOULDER SURGERY Left        Family History   Problem Relation Age of Onset    No Known Problems Mother     Coronary artery disease Family     Esophageal cancer Family     Heart disease Family     Mental illness Neg Hx        Social History     Occupational History    Not on file   Tobacco Use    Smoking status: Never Smoker    Smokeless tobacco: Never Used   Substance and Sexual Activity    Alcohol use: No    Drug use: No    Sexual activity: Not on file         Current Outpatient Medications:     allopurinol (ZYLOPRIM) 100 mg tablet, Take 1 tablet (100 mg total) by mouth daily, Disp: 90 tablet, Rfl: 0    APPLE CIDER VINEGAR PO, Take by mouth, Disp: , Rfl:     colchicine (COLCRYS) 0 6 mg tablet, Take 1 tablet (0 6 mg total) by mouth daily, Disp: 12 tablet, Rfl: 0    meloxicam (MOBIC) 15 mg tablet, TAKE 1 TABLET BY MOUTH EVERY DAY, Disp: 30 tablet, Rfl: 0    olmesartan (BENICAR) 40 mg tablet, Take 1 tablet (40 mg total) by mouth daily, Disp: 90 tablet, Rfl: 1    diclofenac sodium (VOLTAREN) 1 %, Apply 4 g topically 4 (four) times a day for 90 days, Disp: 1440 g, Rfl: 0    No Known Allergies    Objective:  Vitals:    02/03/21 1046   BP: 150/77   Pulse: 90       Left Ankle Exam     Tenderness   The patient is experiencing no tenderness  Swelling: moderate    Range of Motion   Dorsiflexion: 15   Plantar flexion: 35     Muscle Strength   Dorsiflexion:  5/5   Plantar flexion:  5/5   Peroneal muscle:  5/5    Tests   Anterior drawer: negative    Other   Erythema: absent  Sensation: normal  Pulse: present    Comments:  Left foot Hyper pronates when bearing weight   He does have skin changes consistent with chronic venous stasis into the lower leg            Strength/Myotome Testing     Left Ankle/Foot   Dorsiflexion: 5  Plantar flexion: 5      Physical Exam  Vitals signs reviewed  Constitutional:       Appearance: He is well-developed  He is obese  HENT:      Head: Normocephalic and atraumatic  Eyes:      General:         Right eye: No discharge           Left eye: No discharge  Conjunctiva/sclera: Conjunctivae normal    Neck:      Musculoskeletal: Normal range of motion and neck supple  Cardiovascular:      Rate and Rhythm: Regular rhythm  Pulmonary:      Effort: Pulmonary effort is normal  No respiratory distress  Skin:     General: Skin is warm and dry  Neurological:      Mental Status: He is alert and oriented to person, place, and time  Psychiatric:         Behavior: Behavior normal          I have personally reviewed pertinent films in PACS and my interpretation is as follows:  Xrays of the left ankle demonstrate mild arthritic changes  There are osteophytes arising anteriorly and medially off of the distal tibia

## 2021-02-10 DIAGNOSIS — M10.9 GOUT, UNSPECIFIED CAUSE, UNSPECIFIED CHRONICITY, UNSPECIFIED SITE: ICD-10-CM

## 2021-02-10 RX ORDER — COLCHICINE 0.6 MG/1
0.6 TABLET ORAL DAILY
Qty: 12 TABLET | Refills: 0 | Status: SHIPPED | OUTPATIENT
Start: 2021-02-10 | End: 2021-05-02 | Stop reason: SDUPTHER

## 2021-02-22 ENCOUNTER — TELEPHONE (OUTPATIENT)
Dept: FAMILY MEDICINE CLINIC | Facility: CLINIC | Age: 51
End: 2021-02-22

## 2021-02-22 NOTE — TELEPHONE ENCOUNTER
Pt may use [facility] but [employer] is [employer] which does not cover Colchicine   Pt has not been seen in our Office since 03/2019  Called pt left a detailed message to schedule an apt  At that time Mitigare or Probenecid is covered for gout flare ups  Dr Marie Castro is aware    [facility]

## 2021-02-22 NOTE — TELEPHONE ENCOUNTER
Calling the "[employer] an [employer]"aport has not ever filled any prescriptions for this person    ID [id number]   Checking if the Clochicine 0 6mg is covered or a PA is needed  rmklpn

## 2021-02-23 DIAGNOSIS — R10.9 FLANK PAIN: ICD-10-CM

## 2021-02-23 RX ORDER — MELOXICAM 15 MG/1
15 TABLET ORAL DAILY
Qty: 30 TABLET | Refills: 1 | Status: SHIPPED | OUTPATIENT
Start: 2021-02-23 | End: 2021-04-30

## 2021-02-25 ENCOUNTER — TELEPHONE (OUTPATIENT)
Dept: FAMILY MEDICINE CLINIC | Facility: CLINIC | Age: 51
End: 2021-02-25

## 2021-02-25 NOTE — TELEPHONE ENCOUNTER
Pt needs an apt with Dr Marie Castro  Pt has not been seen since 03/2019  Leilani  is covered under pt's insurance as well as [employer] pill pack ONCE SEEN BY DR Pacheco Smoker  Pt has already been called twice for an apt  Dr Marie Castro was aware of above a week ago    [facility]

## 2021-02-25 NOTE — TELEPHONE ENCOUNTER
[facility] called stating they sent us a fax for a prior auth  Pls look in [address] and fax back when completed

## 2021-02-26 ENCOUNTER — CONSULT (OUTPATIENT)
Dept: VASCULAR SURGERY | Facility: CLINIC | Age: 51
End: 2021-02-26
Payer: COMMERCIAL

## 2021-02-26 VITALS
SYSTOLIC BLOOD PRESSURE: 160 MMHG | HEIGHT: 72 IN | DIASTOLIC BLOOD PRESSURE: 90 MMHG | WEIGHT: 315 LBS | HEART RATE: 78 BPM | BODY MASS INDEX: 42.66 KG/M2 | TEMPERATURE: 98.3 F

## 2021-02-26 DIAGNOSIS — E66.01 CLASS 3 SEVERE OBESITY DUE TO EXCESS CALORIES WITH BODY MASS INDEX (BMI) OF 50.0 TO 59.9 IN ADULT, UNSPECIFIED WHETHER SERIOUS COMORBIDITY PRESENT (HCC): ICD-10-CM

## 2021-02-26 DIAGNOSIS — I87.2 VENOUS INSUFFICIENCY OF LEFT LEG: ICD-10-CM

## 2021-02-26 DIAGNOSIS — I87.2 CHRONIC VENOUS STASIS DERMATITIS OF LEFT LOWER EXTREMITY: ICD-10-CM

## 2021-02-26 DIAGNOSIS — I83.893 VARICOSE VEINS OF BOTH LEGS WITH EDEMA: Primary | ICD-10-CM

## 2021-02-26 PROBLEM — E66.813 CLASS 3 SEVERE OBESITY DUE TO EXCESS CALORIES WITH BODY MASS INDEX (BMI) OF 50.0 TO 59.9 IN ADULT (HCC): Status: ACTIVE | Noted: 2021-02-26

## 2021-02-26 PROCEDURE — 3008F BODY MASS INDEX DOCD: CPT | Performed by: PHYSICIAN ASSISTANT

## 2021-02-26 PROCEDURE — 3080F DIAST BP >= 90 MM HG: CPT | Performed by: PHYSICIAN ASSISTANT

## 2021-02-26 PROCEDURE — 99244 OFF/OP CNSLTJ NEW/EST MOD 40: CPT | Performed by: PHYSICIAN ASSISTANT

## 2021-02-26 PROCEDURE — 3077F SYST BP >= 140 MM HG: CPT | Performed by: PHYSICIAN ASSISTANT

## 2021-02-26 NOTE — PROGRESS NOTES
Assessment/Plan:    LEFT lower extremity bulging varicose veins   Venous Insufficiency  Chronic stasis dermitis  Risk for wounds  Very severe morbid obesity    - Bulky L LE varicose veins   - Tired, aching legs  - L LE moderate chronic skin changes with risk for wounds          Discussion:  We had a detailed discussion regarding the pathophysiology and treatment of venous disease  We discussed the role for compression and other conservative measures - such as walking, exercise and weight loss  He is motivated to continue to lose weight  Given the large varicose veins in the medial L lower leg and the potential compromise to the skin  We will check a venous reflux study for further evaluation  He was fitted with ACE wraps in the office today due to the size of the legs  He should continue with ACE wrap compression until we can get the maked edema under better control  If we can't get edema under control, he may be a candidate for lymphedema pumps  - Order for prescription graded compression stockings of 20-30 mm Hg  - Compression garment with ACE wraps or stockings, elevation, low sodium   - Regular exercise for weight loss  - Patient education for venous insufficiency    - Check left lower extremity venous reflux study and rto with VS to review         Diagnoses and all orders for this visit:    Varicose veins of both legs with edema    Chronic venous stasis dermatitis of left lower extremity  -     Ambulatory referral to Vascular Surgery  -     Compression Stocking  -     VAS reflux lower limb venous duplex study with reflux assesment, unilateral; Future    Venous insufficiency of left leg  -     Ambulatory referral to Vascular Surgery  -     Compression Stocking  -     VAS reflux lower limb venous duplex study with reflux assesment, unilateral; Future    Class 3 severe obesity due to excess calories with body mass index (BMI) of 50 0 to 59 9 in adult, unspecified whether serious comorbidity present ([facility] [place] [facility] )  - Ambulatory referral to Vascular Surgery  -     Compression Stocking  -     VAS reflux lower limb venous duplex study with reflux assesment, unilateral; Future        Subjective:      Patient ID: Sara Mendenhall is a 48 y o  male  Patient is new and referred by Tania James MD for evaluation of leg pain, swelling and discoloration  HPI   Sara Mendenhall is a 48 y o  male very severe morbid obesity, hypertension, ARIEL, hx adenocarcinoma of the prostate s/p prostatecomy, abnormal glucose and varicose veins  Patient is referred for evaluation of L LE varicose veins with pain, edema and discoloration to leg  Patient complains of bulging varicose veins to the LEFT leg  He elevates and at times uses NSAIDS for arthritic and leg pain  He has chronic swelling to the legs and prior superficial skin breakdown to the L leg  No current open wounds, but L medial malleolus skin integrity is concerning  He has a long history of tired, heavy and aching legs which attibuted to his weight  He is motivated to lose weight and already working on changed diet  He does not wear compression garment  No history of DVT or phlebitis  No family hx vv  The following portions of the patient's history were reviewed and updated as appropriate: allergies, current medications, past family history, past medical history, past social history, past surgical history and problem list     Review of Systems   Constitutional: Negative  HENT: Negative  Eyes: Negative  Respiratory: Negative  Cardiovascular: Positive for leg swelling (Left leg)  Gastrointestinal: Negative  Endocrine: Negative  Genitourinary: Negative  Musculoskeletal: Positive for joint swelling  Leg pain   Skin: Positive for color change  Allergic/Immunologic: Negative  Neurological: Negative  Hematological: Negative  Psychiatric/Behavioral: Negative            Objective:              LEFT great toe injury    /90 (BP Location: Left arm, Patient Position: Sitting, Cuff Size: Standard)   Pulse 78   Temp 98 3 °F (36 8 °C) (Tympanic)   Ht 6' (1 829 m)   Wt (!) 184 kg (405 lb)   BMI 54 93 kg/m²         Physical Exam  Vitals signs and nursing note reviewed  Constitutional:       Appearance: He is well-developed  He is obese  HENT:      Head: Normocephalic and atraumatic  Eyes:      General: No scleral icterus  Pupils: Pupils are equal, round, and reactive to light  Neck:      Musculoskeletal: Neck supple  Thyroid: No thyromegaly  Vascular: No JVD  Trachea: Trachea normal    Cardiovascular:      Rate and Rhythm: Normal rate and regular rhythm  Pulses:           Carotid pulses are 2+ on the right side and 2+ on the left side  Radial pulses are 2+ on the right side and 2+ on the left side  Dorsalis pedis pulses are 2+ on the right side and 2+ on the left side  Posterior tibial pulses are detected w/ Doppler on the right side and detected w/ Doppler on the left side  Heart sounds: Normal heart sounds, S1 normal and S2 normal  No murmur  No friction rub  No gallop  Comments:     Good signals in feet  Pulmonary:      Effort: Pulmonary effort is normal  No accessory muscle usage or respiratory distress  Breath sounds: Normal breath sounds  No wheezing or rales  Abdominal:      General: Bowel sounds are normal  There is no distension  Palpations: Abdomen is soft  Tenderness: There is no abdominal tenderness  Comments: Obese   Musculoskeletal: Normal range of motion  General: No deformity  Skin:     General: Skin is warm and dry  Capillary Refill: Capillary refill takes 2 to 3 seconds  Findings: No lesion or rash  Nails: There is no clubbing  Neurological:      Mental Status: He is alert and oriented to person, place, and time        Comments: Grossly normal    Psychiatric:         Mood and Affect: Mood normal          Behavior: Behavior is cooperative  Thought Content: Thought content normal              I have reviewed and made appropriate changes to the review of systems input by the medical assistant      Vitals:    02/26/21 1442   BP: 160/90   BP Location: Left arm   Patient Position: Sitting   Cuff Size: Standard   Pulse: 78   Temp: 98 3 °F (36 8 °C)   TempSrc: Tympanic   Weight: (!) 184 kg (405 lb)   Height: 6' (1 829 m)       Patient Active Problem List   Diagnosis    Abnormal glucose    Adenocarcinoma of prostate (HCC)    Benign essential hypertension    Elevated liver enzymes    Gout    Obstructive sleep apnea    Osteoarthritis of both knees    Asymptomatic varicose veins    Venous insufficiency (chronic) (peripheral)    Continuous leakage of urine       Past Surgical History:   Procedure Laterality Date    PROSTATECTOMY      SHOULDER SURGERY Left        Family History   Problem Relation Age of Onset    No Known Problems Mother     Coronary artery disease Family     Esophageal cancer Family     Heart disease Family     Mental illness Neg Hx        Social History     Socioeconomic History    Marital status: /Civil Union     Spouse name: Not on file    Number of children: Not on file    Years of education: Not on file    Highest education level: Not on file   Occupational History    Not on file   Social Needs    Financial resource strain: Not on file    Food insecurity     Worry: Not on file     Inability: Not on file   [employer] needs     Medical: Not on file     Non-medical: Not on file   Tobacco Use    Smoking status: Never Smoker    Smokeless tobacco: Never Used   Substance and Sexual Activity    Alcohol use: No    Drug use: No    Sexual activity: Not on file   Lifestyle    Physical activity     Days per week: Not on file     Minutes per session: Not on file    Stress: Not on file   Relationships    Social connections     Talks on phone: Not on file     Gets together: Not on file Attends Confucianism service: Not on file     Active member of club or organization: Not on file     Attends meetings of clubs or organizations: Not on file     Relationship status: Not on file    Intimate partner violence     Fear of current or ex partner: Not on file     Emotionally abused: Not on file     Physically abused: Not on file     Forced sexual activity: Not on file   Other Topics Concern    Not on file   Social History Narrative    Not on file       No Known Allergies      Current Outpatient Medications:     allopurinol (ZYLOPRIM) 100 mg tablet, Take 1 tablet (100 mg total) by mouth daily, Disp: 90 tablet, Rfl: 0    APPLE CIDER VINEGAR PO, Take by mouth, Disp: , Rfl:     meloxicam (MOBIC) 15 mg tablet, Take 1 tablet (15 mg total) by mouth daily, Disp: 30 tablet, Rfl: 1    olmesartan (BENICAR) 40 mg tablet, Take 1 tablet (40 mg total) by mouth daily, Disp: 90 tablet, Rfl: 1    colchicine (COLCRYS) 0 6 mg tablet, Take 1 tablet (0 6 mg total) by mouth daily (Patient not taking: Reported on 2/26/2021), Disp: 12 tablet, Rfl: 0    diclofenac sodium (VOLTAREN) 1 %, Apply 4 g topically 4 (four) times a day for 90 days, Disp: 1440 g, Rfl: 0

## 2021-02-26 NOTE — PATIENT INSTRUCTIONS
Venous Insufficiency  Chronic dermitis  Risk for wounds    - Order for prescription graded compression stockings of 20-30 mm Hg  - Wear stocking EVERY day to help control swelling in legs and remove at night  - Elevate legs while at rest  - Continue healthy life-style changes; heart-healthy, low sodium diet; regular exercise for weight loss  - Patient education for venous insufficiency  - Check venous reflux study and rto to review          Varicose Veins, Ambulatory Care   GENERAL INFORMATION:   Varicose veins  are veins that become large, twisted, and swollen  They are common on the back of your calves, knees, and thighs  Common symptoms include the following:   · Blue, purple, or bulging veins in your legs     · Pain, swelling, or muscle cramps in your legs    · Feeling of heaviness in your legs  Seek immediate care for the following symptoms:   · A wound that does not heal or is infected    · An injury that has broken your skin and caused your varicose veins to bleed    · Swollen and hard leg    · Pain in your leg that does not go away or gets worse    · Legs or feet turn blue or black    · Warmth, tenderness, and pain in your leg (may also look swollen and red)  Treatment of varicose veins  aims to decrease symptoms, improve appearance, and prevent further problems  Treatment will depend on which veins are affected and how severe your condition is  Prescription pain medicine may be given  Ask how to take this medicine safely  Procedures may be done to remove your varicose veins  Your healthcare provider may inject a solution or use a laser to close the varicose veins  Surgery to remove long veins may also be done  Ask your healthcare provider for more information about procedures used in treating varicose veins  Manage varicose veins:   · Wear pressure stockings  The stockings are tight and put pressure on your legs  They improve blood flow and help prevent clots             · Elevate  your legs above the level of your heart for 15 to 30 minutes several times a day  This will help blood to flow back to your heart  · Avoid sitting or standing for long periods of time  This can cause the blood to collect in your legs and make your symptoms worse  Walk around for a few minutes every hour to get blood moving in your legs  · Avoid wearing tight clothing and shoes  Avoid wearing high-heeled shoes  Do not wear clothes that are tight around the waist     · Get plenty of exercise  Talk to your healthcare provider about the best exercise plan for you  Exercise can decrease your blood pressure and improve your health  Bend or rotate your ankles several times every hour  This will help blood to flow back to the heart  · Maintain a healthy weight  Your heart works harder when you are overweight and this can make varicose vein worse  Ask how much you should weigh  Ask him to help you create a weight loss plan if you are overweight  · Do not smoke  If you smoke, it is never too late to quit  Ask for information if you need help quitting  Follow up with your healthcare provider as directed:  Write down your questions so you remember to ask them during your visits  CARE AGREEMENT:   You have the right to help plan your care  Learn about your health condition and how it may be treated  Discuss treatment options with your caregivers to decide what care you want to receive  You always have the right to refuse treatment  The above information is an  only  It is not intended as medical advice for individual conditions or treatments  Talk to your doctor, nurse or pharmacist before following any medical regimen to see if it is safe and effective for you  © 2014 [address] is for End User's use only and may not be sold, redistributed or otherwise used for commercial purposes   All illustrations and images included in CareNotes® are the copyrighted property of [employer]  or Gary Jarvis

## 2021-03-04 ENCOUNTER — TELEPHONE (OUTPATIENT)
Dept: ADMINISTRATIVE | Facility: HOSPITAL | Age: 51
End: 2021-03-04

## 2021-03-11 ENCOUNTER — TELEPHONE (OUTPATIENT)
Dept: VASCULAR SURGERY | Facility: CLINIC | Age: 51
End: 2021-03-11

## 2021-03-11 ENCOUNTER — HOSPITAL ENCOUNTER (OUTPATIENT)
Dept: RADIOLOGY | Facility: HOSPITAL | Age: 51
Discharge: HOME | End: 2021-03-11
Payer: COMMERCIAL

## 2021-03-11 DIAGNOSIS — I87.2 CHRONIC VENOUS STASIS DERMATITIS OF LEFT LOWER EXTREMITY: ICD-10-CM

## 2021-03-11 DIAGNOSIS — I87.2 VENOUS INSUFFICIENCY OF LEFT LEG: ICD-10-CM

## 2021-03-11 DIAGNOSIS — E66.01 CLASS 3 SEVERE OBESITY DUE TO EXCESS CALORIES WITH BODY MASS INDEX (BMI) OF 50.0 TO 59.9 IN ADULT, UNSPECIFIED WHETHER SERIOUS COMORBIDITY PRESENT (HCC): ICD-10-CM

## 2021-03-11 PROCEDURE — 93971 EXTREMITY STUDY: CPT | Performed by: SURGERY

## 2021-03-11 PROCEDURE — 93971 EXTREMITY STUDY: CPT

## 2021-03-11 NOTE — TELEPHONE ENCOUNTER
[facility] called from [address] vasc lab, pt there for lev, she states he has chronic non occlusive DVT popliteal vein LLE, no acute findings, no hx DVT  ? If pt symptomatic, she states he c/o pain in VV and occasional swelling  B Cyn SIMPSON notified of above, per Aida nothing further to do at this time, pt to keep f/u ov as planned

## 2021-03-23 DIAGNOSIS — M10.9 GOUT, UNSPECIFIED CAUSE, UNSPECIFIED CHRONICITY, UNSPECIFIED SITE: ICD-10-CM

## 2021-03-23 RX ORDER — ALLOPURINOL 100 MG/1
TABLET ORAL
Qty: 90 TABLET | Refills: 0 | Status: SHIPPED | OUTPATIENT
Start: 2021-03-23 | End: 2021-06-16

## 2021-04-09 ENCOUNTER — OFFICE VISIT (OUTPATIENT)
Dept: VASCULAR SURGERY | Facility: CLINIC | Age: 51
End: 2021-04-09
Payer: COMMERCIAL

## 2021-04-09 VITALS
HEART RATE: 90 BPM | BODY MASS INDEX: 42.66 KG/M2 | TEMPERATURE: 96 F | WEIGHT: 315 LBS | SYSTOLIC BLOOD PRESSURE: 130 MMHG | HEIGHT: 72 IN | DIASTOLIC BLOOD PRESSURE: 78 MMHG

## 2021-04-09 DIAGNOSIS — I10 BENIGN ESSENTIAL HYPERTENSION: ICD-10-CM

## 2021-04-09 DIAGNOSIS — I87.2 VENOUS INSUFFICIENCY (CHRONIC) (PERIPHERAL): ICD-10-CM

## 2021-04-09 DIAGNOSIS — I87.2 VENOUS INSUFFICIENCY OF LEFT LEG: Primary | ICD-10-CM

## 2021-04-09 DIAGNOSIS — E66.01 CLASS 3 SEVERE OBESITY DUE TO EXCESS CALORIES WITH BODY MASS INDEX (BMI) OF 50.0 TO 59.9 IN ADULT, UNSPECIFIED WHETHER SERIOUS COMORBIDITY PRESENT (HCC): ICD-10-CM

## 2021-04-09 PROCEDURE — 3078F DIAST BP <80 MM HG: CPT | Performed by: SURGERY

## 2021-04-09 PROCEDURE — 3008F BODY MASS INDEX DOCD: CPT | Performed by: SURGERY

## 2021-04-09 PROCEDURE — 1036F TOBACCO NON-USER: CPT | Performed by: SURGERY

## 2021-04-09 PROCEDURE — 99213 OFFICE O/P EST LOW 20 MIN: CPT | Performed by: SURGERY

## 2021-04-09 PROCEDURE — 3075F SYST BP GE 130 - 139MM HG: CPT | Performed by: SURGERY

## 2021-04-09 NOTE — LETTER
April 9, 2021     Rickey Morales DO  [address]  [address] [facility] [place] [address]    Patient: Omer Cordoba   YOB: 1970   Date of Visit: 4/9/2021       Dear Dr Alexis Brown:    Thank you for referring Omer Cordoba to me for evaluation  Below are the relevant portions of my assessment and plan of care  Patient is here to rev LEVDR done on 3/11/21  The venous valvular insufficiency study shows incompetence of both the great and small saphenous vein in the left lower extremity  The great saphenous vein diameter measures 3 4-7 mm  Patient denies significant symptoms but does have   Patient has LLE Varicose Veins that cause swelling  Patient does wear compression  If you have questions, please do not hesitate to call me  I look forward to following Venice Garrison along with you           Sincerely,        Yari Fernando MD        CC: No Recipients

## 2021-04-09 NOTE — ASSESSMENT & PLAN NOTE
Early lipodermatosclerosis the medial malleolar region  Patient thinks this is improved to some degree since wearing compression  Therefore we have discussed the natural history of chronic venous insufficiency in the development of ulcerations  I have stressed to the patient the importance of continuing with compression therapy  If he is to develops symptoms or worsening of the brownish discoloration he should return to this office for further consideration of endovenous ablation  Patient expressed understanding and willingness to comply

## 2021-04-09 NOTE — PROGRESS NOTES
Assessment/Plan:    Venous insufficiency of left leg  Early lipodermatosclerosis the medial malleolar region  Patient thinks this is improved to some degree since wearing compression  Therefore we have discussed the natural history of chronic venous insufficiency in the development of ulcerations  I have stressed to the patient the importance of continuing with compression therapy  If he is to develops symptoms or worsening of the brownish discoloration he should return to this office for further consideration of endovenous ablation  Patient expressed understanding and willingness to comply  Diagnoses and all orders for this visit:    Venous insufficiency of left leg    Venous insufficiency (chronic) (peripheral)    Benign essential hypertension    Class 3 severe obesity due to excess calories with body mass index (BMI) of 50 0 to 59 9 in adult, unspecified whether serious comorbidity present (HCC)          Subjective:      Patient ID: Marlene Rayo is a 48 y o  adult  Patient is here to rev LEVDR done on 3/11/21  The venous valvular insufficiency study shows incompetence of both the great and small saphenous vein in the left lower extremity  The great saphenous vein diameter measures 3 4-7 mm  Patient denies significant symptoms but does have   Patient has LLE Varicose Veins that cause swelling  Patient does wear compression  The following portions of the patient's history were reviewed and updated as appropriate: allergies, current medications, past family history, past medical history, past social history, past surgical history and problem list     Review of Systems   Constitutional: Negative  HENT: Negative  Eyes: Negative  Respiratory: Negative  Cardiovascular: Positive for leg swelling  Painful veins   Gastrointestinal: Negative  Endocrine: Negative  Genitourinary: Negative  Musculoskeletal: Negative  Skin: Negative  Allergic/Immunologic: Negative  Neurological: Negative  Hematological: Negative  Psychiatric/Behavioral: Negative  Objective:      /78 (BP Location: Left arm, Patient Position: Sitting, Cuff Size: Standard)   Pulse 90   Temp (!) 96 °F (35 6 °C) (Tympanic)   Ht 6' (1 829 m)   Wt (!) 190 kg (418 lb)   BMI 56 69 kg/m²          Physical Exam  Vitals signs and nursing note reviewed  Constitutional:       Appearance: He is well-developed  He is obese  HENT:      Head: Normocephalic and atraumatic  Eyes:      Conjunctiva/sclera: Conjunctivae normal       Pupils: Pupils are equal, round, and reactive to light  Neck:      Musculoskeletal: Normal range of motion and neck supple  Vascular: No JVD  Pulmonary:      Effort: No respiratory distress  Breath sounds: No stridor  No wheezing or rales  Chest:      Chest wall: No tenderness  Abdominal:      General: There is no distension  Palpations: There is no mass  Tenderness: There is no abdominal tenderness  There is no guarding or rebound  Musculoskeletal: Normal range of motion  General: No tenderness or deformity  Comments: Early lipodermatosclerosis of medial malleolar region left lower extremity  No open ulcerations  No obvious varicosities present  Skin is somewhat firm with brownish discoloration   Skin:     General: Skin is warm and dry  Findings: No erythema or rash  Neurological:      Mental Status: He is alert and oriented to person, place, and time  Psychiatric:         Behavior: Behavior normal          Thought Content:  Thought content normal

## 2021-04-30 DIAGNOSIS — R10.9 FLANK PAIN: ICD-10-CM

## 2021-04-30 RX ORDER — MELOXICAM 15 MG/1
TABLET ORAL
Qty: 30 TABLET | Refills: 1 | Status: SHIPPED | OUTPATIENT
Start: 2021-04-30 | End: 2021-06-21

## 2021-05-02 DIAGNOSIS — M10.9 GOUT, UNSPECIFIED CAUSE, UNSPECIFIED CHRONICITY, UNSPECIFIED SITE: ICD-10-CM

## 2021-05-03 RX ORDER — COLCHICINE 0.6 MG/1
0.6 TABLET ORAL DAILY
Qty: 12 TABLET | Refills: 0 | Status: SHIPPED | OUTPATIENT
Start: 2021-05-03 | End: 2021-07-16 | Stop reason: SDUPTHER

## 2021-06-16 DIAGNOSIS — M10.9 GOUT, UNSPECIFIED CAUSE, UNSPECIFIED CHRONICITY, UNSPECIFIED SITE: ICD-10-CM

## 2021-06-16 RX ORDER — ALLOPURINOL 100 MG/1
TABLET ORAL
Qty: 90 TABLET | Refills: 0 | Status: SHIPPED | OUTPATIENT
Start: 2021-06-16 | End: 2021-09-16

## 2021-06-19 DIAGNOSIS — R10.9 FLANK PAIN: ICD-10-CM

## 2021-06-21 RX ORDER — MELOXICAM 15 MG/1
TABLET ORAL
Qty: 30 TABLET | Refills: 1 | Status: SHIPPED | OUTPATIENT
Start: 2021-06-21 | End: 2021-09-13

## 2021-07-14 ENCOUNTER — TELEPHONE (OUTPATIENT)
Dept: BARIATRICS | Facility: CLINIC | Age: 51
End: 2021-07-14

## 2021-07-16 DIAGNOSIS — M10.9 GOUT, UNSPECIFIED CAUSE, UNSPECIFIED CHRONICITY, UNSPECIFIED SITE: ICD-10-CM

## 2021-07-16 RX ORDER — COLCHICINE 0.6 MG/1
0.6 TABLET ORAL DAILY
Qty: 12 TABLET | Refills: 0 | Status: SHIPPED | OUTPATIENT
Start: 2021-07-16 | End: 2021-09-27 | Stop reason: SDUPTHER

## 2021-08-18 DIAGNOSIS — I10 BENIGN ESSENTIAL HYPERTENSION: ICD-10-CM

## 2021-08-27 RX ORDER — OLMESARTAN MEDOXOMIL 40 MG/1
TABLET ORAL
Qty: 90 TABLET | Refills: 1 | Status: SHIPPED | OUTPATIENT
Start: 2021-08-27 | End: 2022-02-21

## 2021-09-11 DIAGNOSIS — R10.9 FLANK PAIN: ICD-10-CM

## 2021-09-13 RX ORDER — MELOXICAM 15 MG/1
TABLET ORAL
Qty: 30 TABLET | Refills: 1 | Status: SHIPPED | OUTPATIENT
Start: 2021-09-13 | End: 2021-11-09

## 2021-09-16 DIAGNOSIS — M10.9 GOUT, UNSPECIFIED CAUSE, UNSPECIFIED CHRONICITY, UNSPECIFIED SITE: ICD-10-CM

## 2021-09-16 RX ORDER — ALLOPURINOL 100 MG/1
TABLET ORAL
Qty: 90 TABLET | Refills: 0 | Status: SHIPPED | OUTPATIENT
Start: 2021-09-16 | End: 2021-12-13

## 2021-09-21 ENCOUNTER — OFFICE VISIT (OUTPATIENT)
Dept: FAMILY MEDICINE CLINIC | Facility: CLINIC | Age: 51
End: 2021-09-21
Payer: COMMERCIAL

## 2021-09-21 VITALS
HEIGHT: 72 IN | BODY MASS INDEX: 42.66 KG/M2 | TEMPERATURE: 97.7 F | HEART RATE: 89 BPM | RESPIRATION RATE: 20 BRPM | WEIGHT: 315 LBS | SYSTOLIC BLOOD PRESSURE: 136 MMHG | OXYGEN SATURATION: 99 % | DIASTOLIC BLOOD PRESSURE: 80 MMHG

## 2021-09-21 DIAGNOSIS — G47.33 OBSTRUCTIVE SLEEP APNEA: ICD-10-CM

## 2021-09-21 DIAGNOSIS — B34.9 VIRAL INFECTION, UNSPECIFIED: ICD-10-CM

## 2021-09-21 DIAGNOSIS — R41.0 CONFUSION: Primary | ICD-10-CM

## 2021-09-21 DIAGNOSIS — R41.82 ALTERED MENTAL STATUS, UNSPECIFIED ALTERED MENTAL STATUS TYPE: ICD-10-CM

## 2021-09-21 DIAGNOSIS — I10 BENIGN ESSENTIAL HYPERTENSION: ICD-10-CM

## 2021-09-21 DIAGNOSIS — R55 PRE-SYNCOPE: ICD-10-CM

## 2021-09-21 PROCEDURE — 3008F BODY MASS INDEX DOCD: CPT | Performed by: NURSE PRACTITIONER

## 2021-09-21 PROCEDURE — 1036F TOBACCO NON-USER: CPT | Performed by: NURSE PRACTITIONER

## 2021-09-21 PROCEDURE — 3075F SYST BP GE 130 - 139MM HG: CPT | Performed by: NURSE PRACTITIONER

## 2021-09-21 PROCEDURE — U0005 INFEC AGEN DETEC AMPLI PROBE: HCPCS | Performed by: NURSE PRACTITIONER

## 2021-09-21 PROCEDURE — 3725F SCREEN DEPRESSION PERFORMED: CPT | Performed by: NURSE PRACTITIONER

## 2021-09-21 PROCEDURE — 3079F DIAST BP 80-89 MM HG: CPT | Performed by: NURSE PRACTITIONER

## 2021-09-21 PROCEDURE — 99214 OFFICE O/P EST MOD 30 MIN: CPT | Performed by: NURSE PRACTITIONER

## 2021-09-21 PROCEDURE — U0003 INFECTIOUS AGENT DETECTION BY NUCLEIC ACID (DNA OR RNA); SEVERE ACUTE RESPIRATORY SYNDROME CORONAVIRUS 2 (SARS-COV-2) (CORONAVIRUS DISEASE [COVID-19]), AMPLIFIED PROBE TECHNIQUE, MAKING USE OF HIGH THROUGHPUT TECHNOLOGIES AS DESCRIBED BY CMS-2020-01-R: HCPCS | Performed by: NURSE PRACTITIONER

## 2021-09-21 NOTE — PROGRESS NOTES
Assessment/Plan:  Assessment and vitals are benign at this time  Will order CT head and patient will schedule that  Advised to go to ER for any worsening or reoccurrence of symptoms  covid-19 test collected  Will follow with PCP back in 2 weeks as if CT head is negative then would like to discuss his anxiety issues with PCP  1  Confusion  -     CT head wo contrast; Future; Expected date: 09/21/2021    2  Altered mental status, unspecified altered mental status type  -     CT head wo contrast; Future; Expected date: 09/21/2021    3  Pre-syncope  -     CT head wo contrast; Future; Expected date: 09/21/2021    4  Viral infection, unspecified  -     Novel Coronavirus (Covid-19),PCR [facility] - Collected in Office    5  Benign essential hypertension  Comments:  stable with current regimen    6  Obstructive sleep apnea  Comments:  complaint with CPAP  BMI Counseling: Body mass index is 55 2 kg/m²  Discussed the patient's BMI with him  The BMI is above normal  Nutrition recommendations include reducing portion sizes, decreasing overall calorie intake, 3-5 servings of fruits/vegetables daily, reducing fast food intake and consuming healthier snacks  Exercise recommendations include exercising 3-5 times per week, joining a gym and strength training exercises  Patient Instructions:  Supportive care discussed and advised  Advised to RTO for any worsening and no improvement  Follow up for no improvement and worsening of conditions  Patient advised and educated when to see immediate medical care  Return if symptoms worsen or fail to improve  Future Appointments   Date Time Provider Jesús Peralta   10/5/2021  1:30 PM DO ASLENA Zuleta [facility]   10/11/2021  1:00 PM WA CT 2 WA CT [facility]           Subjective:      Patient ID: Leavy Nissen is a 46 y o  adult      Chief Complaint   Patient presents with    Anxiety     [id number]         Vitals:  /80   Pulse 89   Temp 97 7 °F (36 5 °C) Resp 20   Ht 6' (1 829 m)   Wt (!) 185 kg (407 lb)   SpO2 99%   BMI 55 20 kg/m²     HPI  Patient accompanied with wife  Stated that yesterday was working at his computer and suddenly started panicking that he had sent 20 emails and stated that got so upset , worried and felt that got confused and not sure if email thing really happened or he tought it happened  He stated that he felt like he was gonna pass out but never did  Wife stated that patient was keep saying that I am having stroke and wife called squad and they checked him and patient's vitals were stable and no stroke symptoms and left him home  Patient stated that he had passed out in past couple of times many years ago and it is all trauma induced and also once had seizure secondary to trauma  Stated that had cardiac work up done and neurology work up done and everything came back normal  Wife stated that he also follows different diets and keto diets on and off and have not been eating and drinking well and could of lead to his symptoms as happened in past too  Wife and patient did not check his emails this morning to check if something was wrong with emails or not  Wife stated that she was exposed to somebody last week with covid-19 positive and she tested negative and concerned for patient as he is not vaccinated  Patient stated that today feels much better just slightly foogy  Stated that not sure if he had panic attack or something else  Complaint with his BP medications and stated that checks his BP at home and stable    Patient is complaint with his CPAP                PHQ-9 Depression Screening    PHQ-9:   Frequency of the following problems over the past two weeks:      Little interest or pleasure in doing things: 0 - not at all  Feeling down, depressed, or hopeless: 0 - not at all  PHQ-2 Score: 0             The following portions of the patient's history were reviewed and updated as appropriate: allergies, current medications, past family history, past medical history, past social history, past surgical history and problem list       Review of Systems   Constitutional: Negative  HENT: Negative  Respiratory: Negative  Cardiovascular: Negative  Gastrointestinal: Negative  Genitourinary: Negative  Neurological: Positive for light-headedness  Negative for dizziness, tremors, seizures, syncope, facial asymmetry, speech difficulty, weakness, numbness and headaches  As noted in HPI     Psychiatric/Behavioral: The patient is nervous/anxious  Objective:    Social History     Tobacco Use   Smoking Status Never Smoker   Smokeless Tobacco Never Used       Allergies: No Known Allergies      Current Outpatient Medications   Medication Sig Dispense Refill    allopurinol (ZYLOPRIM) 100 mg tablet TAKE 1 TABLET BY MOUTH EVERY DAY 90 tablet 0    APPLE CIDER VINEGAR PO Take by mouth      colchicine (COLCRYS) 0 6 mg tablet Take 1 tablet (0 6 mg total) by mouth daily 12 tablet 0    diclofenac sodium (VOLTAREN) 1 % Apply 4 g topically 4 (four) times a day for 90 days 1440 g 0    meloxicam (MOBIC) 15 mg tablet TAKE 1 TABLET BY MOUTH EVERY DAY 30 tablet 1    olmesartan (BENICAR) 40 mg tablet TAKE 1 TABLET BY MOUTH EVERY DAY 90 tablet 1     No current facility-administered medications for this visit  Physical Exam  Constitutional:       Appearance: Normal appearance  He is morbidly obese  HENT:      Head: Normocephalic  Nose: Nose normal    Eyes:      General: No visual field deficit  Conjunctiva/sclera: Conjunctivae normal    Cardiovascular:      Rate and Rhythm: Normal rate and regular rhythm  Heart sounds: Normal heart sounds  Pulmonary:      Effort: Pulmonary effort is normal       Breath sounds: Normal breath sounds  Musculoskeletal:         General: No swelling or tenderness  Normal range of motion  Skin:     General: Skin is warm and dry  Findings: No rash     Neurological:      General: No focal deficit present  Mental Status: He is alert and oriented to person, place, and time  GCS: GCS eye subscore is 4  GCS verbal subscore is 5  GCS motor subscore is 6  Cranial Nerves: No facial asymmetry  Sensory: Sensation is intact  Motor: No weakness  Coordination: Coordination normal       Gait: Gait normal    Psychiatric:         Mood and Affect: Mood normal          Behavior: Behavior normal          Thought Content:  Thought content normal          Judgment: Judgment normal                      RICHARD Haque

## 2021-09-22 ENCOUNTER — TELEPHONE (OUTPATIENT)
Dept: FAMILY MEDICINE CLINIC | Facility: CLINIC | Age: 51
End: 2021-09-22

## 2021-09-22 LAB — SARS-COV-2 RNA RESP QL NAA+PROBE: NEGATIVE

## 2021-09-22 NOTE — TELEPHONE ENCOUNTER
----- Message from Pikk 20 sent at 9/22/2021 11:45 AM EDT -----  Please let patient know that COVID-19 test is negative   RICHARD Marr

## 2021-09-27 DIAGNOSIS — M10.9 GOUT, UNSPECIFIED CAUSE, UNSPECIFIED CHRONICITY, UNSPECIFIED SITE: ICD-10-CM

## 2021-09-28 RX ORDER — COLCHICINE 0.6 MG/1
0.6 TABLET ORAL DAILY
Qty: 12 TABLET | Refills: 0 | Status: SHIPPED | OUTPATIENT
Start: 2021-09-28 | End: 2021-11-23 | Stop reason: SDUPTHER

## 2021-10-05 ENCOUNTER — OFFICE VISIT (OUTPATIENT)
Dept: FAMILY MEDICINE CLINIC | Facility: CLINIC | Age: 51
End: 2021-10-05
Payer: COMMERCIAL

## 2021-10-05 VITALS
WEIGHT: 315 LBS | OXYGEN SATURATION: 96 % | RESPIRATION RATE: 20 BRPM | HEIGHT: 72 IN | BODY MASS INDEX: 42.66 KG/M2 | SYSTOLIC BLOOD PRESSURE: 120 MMHG | DIASTOLIC BLOOD PRESSURE: 72 MMHG | HEART RATE: 67 BPM | TEMPERATURE: 97.5 F

## 2021-10-05 DIAGNOSIS — C61 ADENOCARCINOMA OF PROSTATE (HCC): ICD-10-CM

## 2021-10-05 DIAGNOSIS — Z11.59 NEED FOR HEPATITIS C SCREENING TEST: ICD-10-CM

## 2021-10-05 DIAGNOSIS — M10.9 GOUT, UNSPECIFIED CAUSE, UNSPECIFIED CHRONICITY, UNSPECIFIED SITE: ICD-10-CM

## 2021-10-05 DIAGNOSIS — Z12.11 SCREEN FOR COLON CANCER: ICD-10-CM

## 2021-10-05 DIAGNOSIS — R73.09 ABNORMAL GLUCOSE: ICD-10-CM

## 2021-10-05 DIAGNOSIS — I10 BENIGN ESSENTIAL HYPERTENSION: Primary | ICD-10-CM

## 2021-10-05 DIAGNOSIS — Z13.6 SCREENING FOR CARDIOVASCULAR CONDITION: ICD-10-CM

## 2021-10-05 DIAGNOSIS — Z12.5 SCREENING FOR PROSTATE CANCER: ICD-10-CM

## 2021-10-05 PROCEDURE — 3078F DIAST BP <80 MM HG: CPT | Performed by: FAMILY MEDICINE

## 2021-10-05 PROCEDURE — 99214 OFFICE O/P EST MOD 30 MIN: CPT | Performed by: FAMILY MEDICINE

## 2021-10-05 PROCEDURE — 3008F BODY MASS INDEX DOCD: CPT | Performed by: FAMILY MEDICINE

## 2021-10-05 PROCEDURE — 3074F SYST BP LT 130 MM HG: CPT | Performed by: FAMILY MEDICINE

## 2021-10-05 PROCEDURE — 1036F TOBACCO NON-USER: CPT | Performed by: FAMILY MEDICINE

## 2021-10-11 ENCOUNTER — VBI (OUTPATIENT)
Dept: ADMINISTRATIVE | Facility: OTHER | Age: 51
End: 2021-10-11

## 2021-10-21 LAB
ALBUMIN SERPL-MCNC: 5.1 G/DL (ref 3.8–4.9)
ALBUMIN/GLOB SERPL: 2 {RATIO} (ref 1.2–2.2)
ALP SERPL-CCNC: 86 IU/L (ref 44–121)
ALT SERPL-CCNC: 22 IU/L (ref 0–44)
AST SERPL-CCNC: 14 IU/L (ref 0–40)
BILIRUB SERPL-MCNC: 0.4 MG/DL (ref 0–1.2)
BUN SERPL-MCNC: 18 MG/DL (ref 6–24)
BUN/CREAT SERPL: 19 (ref 9–20)
CALCIUM SERPL-MCNC: 9.9 MG/DL (ref 8.7–10.2)
CHLORIDE SERPL-SCNC: 105 MMOL/L (ref 96–106)
CHOLEST SERPL-MCNC: 194 MG/DL (ref 100–199)
CO2 SERPL-SCNC: 21 MMOL/L (ref 20–29)
CREAT SERPL-MCNC: 0.95 MG/DL (ref 0.76–1.27)
GLOBULIN SER-MCNC: 2.5 G/DL (ref 1.5–4.5)
GLUCOSE SERPL-MCNC: 111 MG/DL (ref 65–99)
HCV AB S/CO SERPL IA: <0.1 S/CO RATIO (ref 0–0.9)
HDLC SERPL-MCNC: 49 MG/DL
LDLC SERPL CALC-MCNC: 131 MG/DL (ref 0–99)
LDLC/HDLC SERPL: 2.7 RATIO (ref 0–3.6)
MICRODELETION SYND BLD/T FISH: NORMAL
POTASSIUM SERPL-SCNC: 4.4 MMOL/L (ref 3.5–5.2)
PROT SERPL-MCNC: 7.6 G/DL (ref 6–8.5)
PSA SERPL-MCNC: <0.1 NG/ML (ref 0–4)
SL AMB EGFR AFRICAN AMERICAN: 107 ML/MIN/1.73
SL AMB EGFR NON AFRICAN AMERICAN: 92 ML/MIN/1.73
SL AMB VLDL CHOLESTEROL CALC: 14 MG/DL (ref 5–40)
SODIUM SERPL-SCNC: 140 MMOL/L (ref 134–144)
TRIGL SERPL-MCNC: 79 MG/DL (ref 0–149)
URATE SERPL-MCNC: 7.7 MG/DL (ref 3.8–8.4)

## 2021-11-09 DIAGNOSIS — R10.9 FLANK PAIN: ICD-10-CM

## 2021-11-09 RX ORDER — MELOXICAM 15 MG/1
TABLET ORAL
Qty: 30 TABLET | Refills: 1 | Status: SHIPPED | OUTPATIENT
Start: 2021-11-09 | End: 2022-01-10

## 2021-11-23 DIAGNOSIS — M10.9 GOUT, UNSPECIFIED CAUSE, UNSPECIFIED CHRONICITY, UNSPECIFIED SITE: ICD-10-CM

## 2021-11-23 RX ORDER — COLCHICINE 0.6 MG/1
0.6 TABLET ORAL DAILY
Qty: 12 TABLET | Refills: 0 | Status: SHIPPED | OUTPATIENT
Start: 2021-11-23 | End: 2021-12-21 | Stop reason: SDUPTHER

## 2021-12-11 DIAGNOSIS — M10.9 GOUT, UNSPECIFIED CAUSE, UNSPECIFIED CHRONICITY, UNSPECIFIED SITE: ICD-10-CM

## 2021-12-13 RX ORDER — ALLOPURINOL 100 MG/1
TABLET ORAL
Qty: 90 TABLET | Refills: 0 | Status: SHIPPED | OUTPATIENT
Start: 2021-12-13 | End: 2022-03-07

## 2021-12-21 DIAGNOSIS — M10.9 GOUT, UNSPECIFIED CAUSE, UNSPECIFIED CHRONICITY, UNSPECIFIED SITE: ICD-10-CM

## 2021-12-21 RX ORDER — COLCHICINE 0.6 MG/1
0.6 TABLET ORAL DAILY
Qty: 12 TABLET | Refills: 0 | Status: SHIPPED | OUTPATIENT
Start: 2021-12-21 | End: 2021-12-31 | Stop reason: SDUPTHER

## 2021-12-31 DIAGNOSIS — M10.9 GOUT, UNSPECIFIED CAUSE, UNSPECIFIED CHRONICITY, UNSPECIFIED SITE: ICD-10-CM

## 2022-01-03 RX ORDER — COLCHICINE 0.6 MG/1
0.6 TABLET ORAL DAILY
Qty: 12 TABLET | Refills: 0 | Status: SHIPPED | OUTPATIENT
Start: 2022-01-03

## 2022-01-08 DIAGNOSIS — R10.9 FLANK PAIN: ICD-10-CM

## 2022-01-10 RX ORDER — MELOXICAM 15 MG/1
TABLET ORAL
Qty: 30 TABLET | Refills: 1 | Status: SHIPPED | OUTPATIENT
Start: 2022-01-10 | End: 2022-03-08

## 2022-02-20 DIAGNOSIS — I10 BENIGN ESSENTIAL HYPERTENSION: ICD-10-CM

## 2022-02-21 RX ORDER — OLMESARTAN MEDOXOMIL 40 MG/1
TABLET ORAL
Qty: 90 TABLET | Refills: 1 | Status: SHIPPED | OUTPATIENT
Start: 2022-02-21

## 2022-03-06 DIAGNOSIS — M10.9 GOUT, UNSPECIFIED CAUSE, UNSPECIFIED CHRONICITY, UNSPECIFIED SITE: ICD-10-CM

## 2022-03-07 RX ORDER — ALLOPURINOL 100 MG/1
TABLET ORAL
Qty: 90 TABLET | Refills: 0 | Status: SHIPPED | OUTPATIENT
Start: 2022-03-07 | End: 2022-06-01

## 2022-03-08 DIAGNOSIS — R10.9 FLANK PAIN: ICD-10-CM

## 2022-03-08 RX ORDER — MELOXICAM 15 MG/1
TABLET ORAL
Qty: 30 TABLET | Refills: 1 | Status: SHIPPED | OUTPATIENT
Start: 2022-03-08 | End: 2022-05-12

## 2022-05-11 DIAGNOSIS — R10.9 FLANK PAIN: ICD-10-CM

## 2022-05-12 RX ORDER — MELOXICAM 15 MG/1
TABLET ORAL
Qty: 30 TABLET | Refills: 1 | Status: SHIPPED | OUTPATIENT
Start: 2022-05-12 | End: 2022-07-18

## 2022-06-01 DIAGNOSIS — M10.9 GOUT, UNSPECIFIED CAUSE, UNSPECIFIED CHRONICITY, UNSPECIFIED SITE: ICD-10-CM

## 2022-06-01 RX ORDER — ALLOPURINOL 100 MG/1
TABLET ORAL
Qty: 90 TABLET | Refills: 0 | Status: SHIPPED | OUTPATIENT
Start: 2022-06-01

## 2022-07-10 DIAGNOSIS — R10.9 FLANK PAIN: ICD-10-CM

## 2022-07-18 RX ORDER — MELOXICAM 15 MG/1
TABLET ORAL
Qty: 30 TABLET | Refills: 1 | Status: SHIPPED | OUTPATIENT
Start: 2022-07-18 | End: 2022-09-12

## 2022-08-22 DIAGNOSIS — I10 BENIGN ESSENTIAL HYPERTENSION: ICD-10-CM

## 2022-08-22 RX ORDER — OLMESARTAN MEDOXOMIL 40 MG/1
TABLET ORAL
Qty: 90 TABLET | Refills: 1 | Status: SHIPPED | OUTPATIENT
Start: 2022-08-22

## 2022-09-09 DIAGNOSIS — M10.9 GOUT, UNSPECIFIED CAUSE, UNSPECIFIED CHRONICITY, UNSPECIFIED SITE: ICD-10-CM

## 2022-09-11 DIAGNOSIS — R10.9 FLANK PAIN: ICD-10-CM

## 2022-09-12 ENCOUNTER — VBI (OUTPATIENT)
Dept: ADMINISTRATIVE | Facility: OTHER | Age: 52
End: 2022-09-12

## 2022-09-12 RX ORDER — MELOXICAM 15 MG/1
TABLET ORAL
Qty: 30 TABLET | Refills: 1 | Status: SHIPPED | OUTPATIENT
Start: 2022-09-12

## 2022-09-12 NOTE — TELEPHONE ENCOUNTER
09/12/22 10:21 AM     See documentation in the [employer] SmartForm   gaps    [facility]

## 2022-09-29 RX ORDER — ALLOPURINOL 100 MG/1
TABLET ORAL
Qty: 90 TABLET | Refills: 0 | Status: SHIPPED | OUTPATIENT
Start: 2022-09-29 | End: 2022-10-02 | Stop reason: SDUPTHER

## 2022-10-02 DIAGNOSIS — M10.9 GOUT, UNSPECIFIED CAUSE, UNSPECIFIED CHRONICITY, UNSPECIFIED SITE: ICD-10-CM

## 2022-10-04 ENCOUNTER — OFFICE VISIT (OUTPATIENT)
Dept: PULMONOLOGY | Facility: MEDICAL CENTER | Age: 52
End: 2022-10-04
Payer: COMMERCIAL

## 2022-10-04 VITALS
TEMPERATURE: 98 F | RESPIRATION RATE: 12 BRPM | BODY MASS INDEX: 42.66 KG/M2 | HEIGHT: 72 IN | DIASTOLIC BLOOD PRESSURE: 86 MMHG | OXYGEN SATURATION: 100 % | SYSTOLIC BLOOD PRESSURE: 144 MMHG | HEART RATE: 77 BPM | WEIGHT: 315 LBS

## 2022-10-04 DIAGNOSIS — I10 BENIGN ESSENTIAL HYPERTENSION: ICD-10-CM

## 2022-10-04 DIAGNOSIS — G47.33 OBSTRUCTIVE SLEEP APNEA: Primary | ICD-10-CM

## 2022-10-04 DIAGNOSIS — E66.01 CLASS 3 SEVERE OBESITY DUE TO EXCESS CALORIES WITH BODY MASS INDEX (BMI) OF 50.0 TO 59.9 IN ADULT, UNSPECIFIED WHETHER SERIOUS COMORBIDITY PRESENT (HCC): ICD-10-CM

## 2022-10-04 PROCEDURE — 99214 OFFICE O/P EST MOD 30 MIN: CPT | Performed by: INTERNAL MEDICINE

## 2022-10-04 RX ORDER — ALLOPURINOL 100 MG/1
100 TABLET ORAL DAILY
Qty: 90 TABLET | Refills: 0 | Status: SHIPPED | OUTPATIENT
Start: 2022-10-04

## 2022-10-04 NOTE — PROGRESS NOTES
Assessment/Plan        Problem List Items Addressed This Visit        Respiratory    Obstructive sleep apnea - Primary     Severe obstructive sleep apnea with good compliance to CPAP therapy  I did him see respiratory therapist from [facility] while he was in our office today and patient was shown different mask interfaces as he did not like his present fullface mask  He did like the large size AirFit N20 nasal mask  This should avoid causing marks and indentations on the side of his face  He does have an auto dream station CPAP machine she is not on recall list   Is function well  He was set up with this machine 12/15/2020  Review compliance data and he has used it on regular basis every night for over 8 hours per night  He set on CPAP pressure of 18 in his average AHI on compliance data was 0 3 which is good  Continue CPAP at 18 cm water pressure  Relevant Orders    PAP DME Resupply/Reorder       Cardiovascular and Mediastinum    Benign essential hypertension     He does take olmesartan 40 mg daily for his hypertension  Blood pressure today is satisfactory  I did recommend to him about weight loss and dietary modification            Other    Class 3 severe obesity due to excess calories with body mass index (BMI) of 50 0 to 59 9 in [facility])     I did discuss diet and weight loss with him  I did recommend a book, Get better fast and  make it last, by Dr Geovani Hernandez  This has advice on diet as well as lifestyle changes  Cc:  Has issues with his face mask causing indentations on sides of his face after using it  GIAN     Maylin Corey is 46years old and his here for follow-up of his severe ARIEL  He had initial diagnostic sleep study done 2010 which showed AHI of 87 5 with oxygen anmol 68%  He is on CPAP of 18 cm and doing well without setting  He has been using his CPAP every night and benefiting from it  Feels like his sleep is refreshing has no nocturnal dyspnea  Stated he has gained 20 lb recently  Also he would prefer a different type mask as his present full face massk causes indentations on his sides of his face  He did get a new dream station CPAP machine December 15, 2020  This machine was not on recall list     Does have history of hypertension and is on olmesartan 40 mg daily  Also has history of gout  Has history of prostate cancer and [place] [facility] [place]  He never smoked  Has  seizure disorder and past from when he experiences severe pain  Has not had seizure in several years    Had Legionella pneumonia for which he was hospitalized  In 2010 at [facility]  He is not having shortness of breath or wheezing  Jayant Side He does have history of gout  He is mostly sedentary    He does work doing IT work    Past Medical History:   Diagnosis Date    Arthritis     Arthropathy 04/06/2011    Last assessed    Fingertip amputation     Gout     Hypertension     Hypotension     resulting from Keto diet per pt    Obesity     Pneumonia     [facility] [address] [facility]     Seizures ([facility] [address] [facility]     Sleep apnea     Tachycardia 03/03/2008    Last assessed    Varicose veins of lower extremity        Past Surgical History:   Procedure Laterality Date    PROSTATECTOMY      SHOULDER SURGERY Left          Current Outpatient Medications:     APPLE CIDER VINEGAR PO, Take by mouth, Disp: , Rfl:     colchicine (COLCRYS) 0 6 mg tablet, Take 1 tablet (0 6 mg total) by mouth daily, Disp: 12 tablet, Rfl: 0    meloxicam (MOBIC) 15 mg tablet, TAKE 1 TABLET BY MOUTH EVERY DAY, Disp: 30 tablet, Rfl: 1    olmesartan (BENICAR) 40 mg tablet, TAKE 1 TABLET BY MOUTH EVERY DAY, Disp: 90 tablet, Rfl: 1    allopurinol (ZYLOPRIM) 100 mg tablet, Take 1 tablet (100 mg total) by mouth daily, Disp: 90 tablet, Rfl: 0    diclofenac sodium (VOLTAREN) 1 %, Apply 4 g topically 4 (four) times a day for 90 days, Disp: 1440 g, Rfl: 0    No Known Allergies    Social History     Tobacco Use    Smoking status: Never Smoker    Smokeless tobacco: Never Used   Substance Use Topics    Alcohol use: No         Family History   Problem Relation Age of Onset    No Known Problems Mother     Coronary artery disease Family     Esophageal cancer Family     Heart disease Family     Mental illness Neg Hx        Review of Systems   Constitutional: Negative for chills, fever and unexpected weight change  HENT: Negative for congestion, rhinorrhea and sore throat  Eyes: Negative for discharge and redness  Respiratory: Negative for cough and shortness of breath  Cardiovascular: Negative for chest pain, palpitations and leg swelling  Gastrointestinal: Negative for abdominal distention, abdominal pain and nausea  Endocrine: Negative for polydipsia and polyphagia  Genitourinary: Negative for dysuria  Musculoskeletal: Negative for joint swelling and myalgias  Skin: Negative for rash  Neurological: Negative for light-headedness  Psychiatric/Behavioral: Negative for decreased concentration  Vitals:    10/04/22 1409   BP: 144/86   Pulse: 77   Resp: 12   Temp: 98 °F (36 7 °C)   SpO2: 100%     Height: 6' (182 9 cm)  IBW (Ideal Body Weight): 77 6 kg  Body mass index is 58 21 kg/m²  Weight (last 2 days)     Date/Time Weight    10/04/22 1409 195 (429 2)              Physical Exam  Vitals and nursing note reviewed  Constitutional:       Appearance: Normal appearance  He is well-developed  He is obese  HENT:      Head: Normocephalic and atraumatic  Right Ear: External ear normal       Left Ear: External ear normal       Mouth/Throat:      Mouth: Mucous membranes are moist       Pharynx: Oropharynx is clear  Comments: Mallampati score 3  Some mild-to-moderate narrowing of oropharyngeal airway  Eyes:      Conjunctiva/sclera: Conjunctivae normal    Cardiovascular:      Rate and Rhythm: Normal rate and regular rhythm  Heart sounds:  No murmur heard  Pulmonary:      Effort: Pulmonary effort is normal  No respiratory distress  Breath sounds: Normal breath sounds  Comments: Lung sounds are clear  No wheezes, crackles or rhonchi  Abdominal:      Palpations: Abdomen is soft  Tenderness: There is no abdominal tenderness  Musculoskeletal:      Cervical back: Neck supple  Comments: No edema, cyanosis or clubbing   Skin:     General: Skin is warm and dry  Neurological:      General: No focal deficit present  Mental Status: He is alert and oriented to person, place, and time     Psychiatric:         Mood and Affect: Mood normal          Behavior: Behavior normal            :

## 2022-10-04 NOTE — PATIENT INSTRUCTIONS
AirFit N20 large size fullface mask    Book:  Feel better fast and Make it Last, Dr Alena Curtis bread - frozen    Back telephone:  [phone]

## 2022-10-05 LAB
DME PARACHUTE DELIVERY DATE ACTUAL: NORMAL
DME PARACHUTE DELIVERY DATE REQUESTED: NORMAL
DME PARACHUTE ITEM DESCRIPTION: NORMAL
DME PARACHUTE ORDER STATUS: NORMAL
DME PARACHUTE SUPPLIER NAME: NORMAL
DME PARACHUTE SUPPLIER PHONE: NORMAL

## 2022-10-06 NOTE — ASSESSMENT & PLAN NOTE
He does take olmesartan 40 mg daily for his hypertension  Blood pressure today is satisfactory    I did recommend to him about weight loss and dietary modification

## 2022-10-06 NOTE — ASSESSMENT & PLAN NOTE
I did discuss diet and weight loss with him  I did recommend a book, Get better fast and  make it last, by Dr Krystal Bynum  This has advice on diet as well as lifestyle changes

## 2022-10-06 NOTE — ASSESSMENT & PLAN NOTE
Severe obstructive sleep apnea with good compliance to CPAP therapy  I did him see respiratory therapist from [facility] while he was in our office today and patient was shown different mask interfaces as he did not like his present fullface mask  He did like the large size AirFit N20 nasal mask  This should avoid causing marks and indentations on the side of his face  He does have an auto dream station CPAP machine she is not on recall list   Is function well  He was set up with this machine 12/15/2020  Review compliance data and he has used it on regular basis every night for over 8 hours per night  He set on CPAP pressure of 18 in his average AHI on compliance data was 0 3 which is good  Continue CPAP at 18 cm water pressure

## 2022-10-26 ENCOUNTER — TELEPHONE (OUTPATIENT)
Dept: PULMONOLOGY | Facility: CLINIC | Age: 52
End: 2022-10-26

## 2022-10-26 NOTE — TELEPHONE ENCOUNTER
Pt LM re: he was in a few weeks ago and was asked if his CPAP was on the recall to which he replied no but upon further investigation his CPAP is indeed on the recall  He stated he "filled out the survey" and wanted to know if there was anything else he should do? He is also having issues with his new mask and has a question for the doctor he forgot to ask at the appt    Please advise: [phone]

## 2022-10-27 NOTE — TELEPHONE ENCOUNTER
Lm for pt to see what other questions he had and to make sure he registered his unit with [facility] correctly if recalled

## 2022-11-08 DIAGNOSIS — R10.9 FLANK PAIN: ICD-10-CM

## 2022-11-14 RX ORDER — MELOXICAM 15 MG/1
TABLET ORAL
Qty: 30 TABLET | Refills: 1 | Status: SHIPPED | OUTPATIENT
Start: 2022-11-14

## 2023-01-02 DIAGNOSIS — M10.9 GOUT, UNSPECIFIED CAUSE, UNSPECIFIED CHRONICITY, UNSPECIFIED SITE: ICD-10-CM

## 2023-01-04 RX ORDER — ALLOPURINOL 100 MG/1
TABLET ORAL
Qty: 90 TABLET | Refills: 0 | Status: SHIPPED | OUTPATIENT
Start: 2023-01-04

## 2023-01-11 DIAGNOSIS — R10.9 FLANK PAIN: ICD-10-CM

## 2023-01-11 RX ORDER — MELOXICAM 15 MG/1
TABLET ORAL
Qty: 30 TABLET | Refills: 1 | Status: SHIPPED | OUTPATIENT
Start: 2023-01-11

## 2023-01-11 NOTE — TELEPHONE ENCOUNTER
Patient has been sent several [employer] messages over the past 6 months in regards to scheduling an appointment

## 2023-01-23 ENCOUNTER — TELEPHONE (OUTPATIENT)
Dept: PULMONOLOGY | Facility: CLINIC | Age: 53
End: 2023-01-23

## 2023-01-23 NOTE — TELEPHONE ENCOUNTER
Patient is calling, he is asking if we can put in an order for a full face mask instead of the nasal pillows  He is not sleeping at all with the nasal pillows  He is hoping we can get this completed for him so he can get a full nights rest again with the face mask  He believes it was a size medium  He would appreciate a call back with an update if possible   Please advise

## 2023-01-24 LAB
DME PARACHUTE DELIVERY DATE REQUESTED: NORMAL
DME PARACHUTE ITEM DESCRIPTION: NORMAL
DME PARACHUTE ORDER STATUS: NORMAL
DME PARACHUTE SUPPLIER NAME: NORMAL
DME PARACHUTE SUPPLIER PHONE: NORMAL

## 2023-03-03 DIAGNOSIS — I10 BENIGN ESSENTIAL HYPERTENSION: ICD-10-CM

## 2023-03-06 RX ORDER — OLMESARTAN MEDOXOMIL 40 MG/1
40 TABLET ORAL DAILY
Qty: 90 TABLET | Refills: 0 | Status: SHIPPED | OUTPATIENT
Start: 2023-03-06 | End: 2023-03-09 | Stop reason: SDUPTHER

## 2023-03-08 DIAGNOSIS — M10.9 GOUT, UNSPECIFIED CAUSE, UNSPECIFIED CHRONICITY, UNSPECIFIED SITE: ICD-10-CM

## 2023-03-08 DIAGNOSIS — R10.9 FLANK PAIN: ICD-10-CM

## 2023-03-08 RX ORDER — ALLOPURINOL 100 MG/1
100 TABLET ORAL DAILY
Qty: 90 TABLET | Refills: 0 | Status: SHIPPED | OUTPATIENT
Start: 2023-03-08

## 2023-03-08 RX ORDER — MELOXICAM 15 MG/1
15 TABLET ORAL DAILY
Qty: 30 TABLET | Refills: 1 | Status: SHIPPED | OUTPATIENT
Start: 2023-03-08

## 2023-03-09 DIAGNOSIS — I10 BENIGN ESSENTIAL HYPERTENSION: ICD-10-CM

## 2023-03-09 RX ORDER — OLMESARTAN MEDOXOMIL 40 MG/1
40 TABLET ORAL DAILY
Qty: 90 TABLET | Refills: 0 | Status: SHIPPED | OUTPATIENT
Start: 2023-03-09

## 2023-03-29 ENCOUNTER — OFFICE VISIT (OUTPATIENT)
Dept: FAMILY MEDICINE CLINIC | Facility: CLINIC | Age: 53
End: 2023-03-29

## 2023-03-29 VITALS
HEIGHT: 70 IN | TEMPERATURE: 98.1 F | DIASTOLIC BLOOD PRESSURE: 80 MMHG | WEIGHT: 315 LBS | RESPIRATION RATE: 20 BRPM | HEART RATE: 80 BPM | BODY MASS INDEX: 45.1 KG/M2 | SYSTOLIC BLOOD PRESSURE: 136 MMHG

## 2023-03-29 DIAGNOSIS — R74.8 ELEVATED LIVER ENZYMES: ICD-10-CM

## 2023-03-29 DIAGNOSIS — E66.01 MORBID OBESITY WITH BMI OF 60.0-69.9, ADULT (HCC): ICD-10-CM

## 2023-03-29 DIAGNOSIS — Z12.5 SCREENING FOR PROSTATE CANCER: ICD-10-CM

## 2023-03-29 DIAGNOSIS — Z12.11 SCREEN FOR COLON CANCER: ICD-10-CM

## 2023-03-29 DIAGNOSIS — I10 BENIGN ESSENTIAL HYPERTENSION: ICD-10-CM

## 2023-03-29 DIAGNOSIS — Z13.6 SCREENING FOR CARDIOVASCULAR CONDITION: ICD-10-CM

## 2023-03-29 DIAGNOSIS — Z00.00 WELL ADULT EXAM: Primary | ICD-10-CM

## 2023-03-29 DIAGNOSIS — R73.09 ABNORMAL GLUCOSE: ICD-10-CM

## 2023-03-29 DIAGNOSIS — C61 ADENOCARCINOMA OF PROSTATE (HCC): ICD-10-CM

## 2023-03-29 DIAGNOSIS — R10.9 FLANK PAIN: ICD-10-CM

## 2023-03-29 RX ORDER — MELOXICAM 15 MG/1
15 TABLET ORAL DAILY
Qty: 90 TABLET | Refills: 0 | Status: SHIPPED | OUTPATIENT
Start: 2023-03-29

## 2023-03-29 RX ORDER — OLMESARTAN MEDOXOMIL 40 MG/1
40 TABLET ORAL DAILY
Qty: 90 TABLET | Refills: 1 | Status: SHIPPED | OUTPATIENT
Start: 2023-03-29

## 2023-03-29 NOTE — PATIENT INSTRUCTIONS
Obesity   AMBULATORY CARE:   Obesity  means your body mass index (BMI) is greater than 30  Your healthcare provider will use your age, height, and weight to measure your BMI  The risks of obesity include  many health problems, including injuries or physical disability  • Diabetes (high blood sugar level)    • High blood pressure or high cholesterol    • Heart disease    • Stroke    • Gallbladder or liver disease    • Cancer of the colon, breast, prostate, liver, or kidney    • Sleep apnea    • Arthritis or gout    Screening  is done to check for health conditions before you have signs or symptoms  If you are 28to 79years old, your blood sugar level may be checked every 3 years for signs of prediabetes or diabetes  Your healthcare provider will check your blood pressure at each visit  High blood pressure can lead to a stroke or other problems  Your provider may check for signs of heart disease, cancer, or other health problems  Seek care immediately if:   • You have a severe headache, confusion, or difficulty speaking  • You have weakness on one side of your body  • You have chest pain, sweating, or shortness of breath  Call your doctor if:   • You have symptoms of gallbladder or liver disease, such as pain in your upper abdomen  • You have knee or hip pain and discomfort while walking  • You have symptoms of diabetes, such as intense hunger and thirst, and frequent urination  • You have symptoms of sleep apnea, such as snoring or daytime sleepiness  • You have questions or concerns about your condition or care  Treatment for obesity  focuses on helping you lose weight to improve your health  Even a small decrease in BMI can reduce the risk for many health problems  Your healthcare provider will help you set a weight-loss goal   • Lifestyle changes  are the first step in treating obesity  These include making healthy food choices and getting regular physical activity   Your healthcare provider may suggest a weight-loss program that involves coaching, education, and therapy  • Medicine  may help you lose weight when it is used with a healthy foods and physical activity  • Surgery  can help you lose weight if you have obesity along with other health problems  Several types of weight-loss surgery are available  Ask your healthcare provider for more information  Tips for safe weight loss:   • Set small, realistic goals  An example of a small goal is to walk for 20 minutes 5 days a week  Anther goal is to lose 5% of your body weight  • Ask for support  Tell friends, family members, and coworkers about your goals  Ask someone to lose weight with you  You may also want to join a weight-loss support group  • Identify foods or triggers that may cause you to overeat  Remove tempting high-calorie foods from your home and workplace  Place a bowl of fresh fruit on your kitchen counter  If stress causes you to eat, find other ways to cope with stress  A counselor or therapist may be able to help you  • Track your daily calories and activity  Write down what you eat and drink  Also write down how many minutes of physical activity you do each day  • Track your weekly weight  Weigh yourself in the morning, before you eat or drink anything but after you use the bathroom  Use the same scale, in the same place, and in similar clothing each time  Only weigh yourself 1 to 2 times each week, or as directed  You may become discouraged if you weigh yourself every day  Eating changes: You will need to eat 500 to 1,000 fewer calories each day than you currently eat to lose 1 to 2 pounds a week  The following changes will help you cut calories:  • Eat smaller portions  Use small plates, no larger than 9 inches in diameter  Fill your plate half full of fruits and vegetables  Measure your food using measuring cups until you know what a serving size looks like           • Eat 3 meals and 1 or 2 snacks each day  Plan your meals in advance  Patti Nazario and eat at home most of the time  Eat slowly  Do not skip meals  Skipping meals can lead to overeating later in the day  This can make it harder for you to lose weight  Talk with a dietitian to help you make a meal plan and schedule that is right for you  • Eat fruits and vegetables at every meal   They are low in calories and high in fiber, which makes you feel full  Do not add butter, margarine, or cream sauce to vegetables  Use herbs to season steamed vegetables  • Eat less fat and fewer fried foods  Eat more baked or grilled chicken and fish  These protein sources are lower in calories and fat than red meat  Limit fast food  Dress your salads with olive oil and vinegar instead of bottled dressing  • Limit the amount of sugar you eat  Do not drink sugary beverages  Limit alcohol  Activity changes:  Physical activity is good for your body in many ways  It helps you burn calories and build strong muscles  It decreases stress and depression, and improves your mood  It can also help you sleep better  Talk to your healthcare provider before you begin an exercise program   • Exercise for at least 30 minutes 5 days a week  Start slowly  Set aside time each day for physical activity that you enjoy and that is convenient for you  It is best to do both weight training and an activity that increases your heart rate, such as walking, bicycling, or swimming  • Find ways to be more active  Do yard work and housecleaning  Walk up the stairs instead of using elevators  Spend your leisure time going to events that require walking, such as outdoor festivals or fairs  This extra physical activity can help you lose weight and keep it off  Follow up with your doctor as directed: You may need to meet with a dietitian  Write down your questions so you remember to ask them during your visits    © Copyright [employer] 2022 Information is for End User's use only and may not be sold, redistributed or otherwise used for commercial purposes  The above information is an  only  It is not intended as medical advice for individual conditions or treatments  Talk to your doctor, nurse or pharmacist before following any medical regimen to see if it is safe and effective for you

## 2023-03-29 NOTE — PROGRESS NOTES
FAMILY PRACTICE HEALTH MAINTENANCE OFFICE VISIT  [facility] - [facility]    NAME: Asa Mckeon  AGE: 46 y o  SEX: adult  : 1970     DATE: 3/29/2023    Assessment and Plan     1  Well adult exam    2  Benign essential hypertension  -     CBC; Future  -     Comprehensive metabolic panel; Future  -     Lipid Panel with Direct LDL reflex; Future  -     olmesartan (BENICAR) 40 mg tablet; Take 1 tablet (40 mg total) by mouth daily    3  Adenocarcinoma of prostate [facility])  Assessment & Plan:  Pt states urology advised I can follow hi PSA - pt states he was released by Urology    Orders:  -     PSA, Total Screen; Future    4  Abnormal glucose  -     Hemoglobin A1C; Future    5  Elevated liver enzymes    6  Morbid obesity with BMI of 60 0-69 9, adult ([facility] [address] )    7  Screening for cardiovascular condition  -     CBC; Future  -     Comprehensive metabolic panel; Future  -     Lipid Panel with Direct LDL reflex; Future    8  Screening for prostate cancer  -     PSA, Total Screen; Future    9  Screen for colon cancer  -     Ambulatory referral for colonoscopy; Future    10  Flank pain  -     meloxicam (MOBIC) 15 mg tablet; Take 1 tablet (15 mg total) by mouth daily        Patient Counseling:   Nutrition: Stressed importance of a well balanced diet, moderation of sodium/saturated fat, caloric balance and sufficient intake of fiber  Exercise: Stressed the importance of regular exercise with a goal of 150 minutes per week  Dental Health: Discussed daily flossing and brushing and regular dental visits     Immunizations reviewed: Up To Date  Discussed benefits of:  Colon Cancer Screening, Prostate Cancer Screening  and Screening labs  BMI Counseling: Body mass index is 61 72 kg/m²  Discussed with patient's BMI with him  The BMI is above normal  Nutrition recommendations include reducing portion sizes  Return in about 6 months (around 2023) for Recheck          Chief Complaint     Chief Complaint Patient presents with   • Physical Exam     Sas/cma       History of Present Illness     Pt is sched for a full physical  No recent labs - last set is from 2021    Pt has been doing intermittant fasting for the last month      Well Adult Physical   Patient here for a comprehensive physical exam       Diet and Physical Activity  Diet: intermittant fasting    Exercise: infrequently      Depression Screen  PHQ-2/9 Depression Screening    Little interest or pleasure in doing things: 0 - not at all  Feeling down, depressed, or hopeless: 0 - not at all  PHQ-2 Score: 0  PHQ-2 Interpretation: Negative depression screen          General Health  Hearing: Normal:  bilateral  Vision: wears contacts  Dental: regular dental visits    Reproductive Health  No issues       The following portions of the patient's history were reviewed and updated as appropriate: allergies, current medications, past family history, past medical history, past social history, past surgical history and problem list     Review of Systems     Review of Systems   Constitutional: Negative for activity change, appetite change, chills, diaphoresis, fatigue, fever and unexpected weight change  HENT: Negative for congestion, dental problem, ear pain, mouth sores, sinus pressure, sinus pain, sore throat and trouble swallowing  Eyes: Negative for photophobia, discharge and itching  Respiratory: Negative for apnea, chest tightness and shortness of breath  Cardiovascular: Negative for chest pain, palpitations and leg swelling  Gastrointestinal: Negative for abdominal distention, abdominal pain, blood in stool, nausea and vomiting  Endocrine: Negative for cold intolerance, heat intolerance, polydipsia, polyphagia and polyuria  Genitourinary: Negative for difficulty urinating  Musculoskeletal: Negative for arthralgias  Skin: Negative for color change and wound  Neurological: Negative for dizziness, syncope, speech difficulty and headaches  Hematological: Negative for adenopathy  Psychiatric/Behavioral: Negative for agitation and behavioral problems         Past Medical History     Past Medical History:   Diagnosis Date   • Arthritis    • Arthropathy 04/06/2011    Last assessed   • Fingertip amputation    • Gout    • Hypertension    • Hypotension     resulting from Keto diet per pt   • Obesity    • Pneumonia    • Prostate cancer ([facility] [address] )    • Seizures ([facility] [address] )    • Sleep apnea    • Tachycardia 03/03/2008    Last assessed   • Varicose veins of lower extremity        Past Surgical History     Past Surgical History:   Procedure Laterality Date   • PROSTATECTOMY     • SHOULDER SURGERY Left        Social History     Social History     Socioeconomic History   • Marital status: /Civil Union     Spouse name: None   • Number of children: None   • Years of education: None   • Highest education level: None   Occupational History   • None   Tobacco Use   • Smoking status: Never   • Smokeless tobacco: Never   Vaping Use   • Vaping Use: Never used   Substance and Sexual Activity   • Alcohol use: No   • Drug use: Never   • Sexual activity: None   Other Topics Concern   • None   Social History Narrative   • None     Social Determinants of Health     Financial Resource Strain: Not on file   Food Insecurity: Not on file   Transportation Needs: Not on file   Physical Activity: Not on file   Stress: Not on file   Social Connections: Not on file   Intimate Partner Violence: Not on file   Housing Stability: Not on file       Family History     Family History   Problem Relation Age of Onset   • No Known Problems Mother    • Coronary artery disease Family    • Esophageal cancer Family    • Heart disease Family    • Mental illness Neg Hx        Current Medications       Current Outpatient Medications:   •  allopurinol (ZYLOPRIM) 100 mg tablet, Take 1 tablet (100 mg total) by mouth daily, Disp: 90 tablet, Rfl: 0  •  APPLE CIDER VINEGAR PO, Take by mouth, Disp: , Rfl:   • "colchicine (COLCRYS) 0 6 mg tablet, Take 1 tablet (0 6 mg total) by mouth daily, Disp: 12 tablet, Rfl: 0  •  diclofenac sodium (VOLTAREN) 1 %, Apply 4 g topically 4 (four) times a day for 90 days, Disp: 1440 g, Rfl: 0  •  meloxicam (MOBIC) 15 mg tablet, Take 1 tablet (15 mg total) by mouth daily, Disp: 90 tablet, Rfl: 0  •  olmesartan (BENICAR) 40 mg tablet, Take 1 tablet (40 mg total) by mouth daily, Disp: 90 tablet, Rfl: 1     Allergies     No Known Allergies    Objective     /80   Pulse 80   Temp 98 1 °F (36 7 °C)   Resp 20   Ht 5' 9 5\" (1 765 m)   Wt (!) 192 kg (424 lb)   BMI 61 72 kg/m²      Physical Exam  Vitals and nursing note reviewed  Constitutional:       General: He is not in acute distress  Appearance: He is well-developed  He is not diaphoretic  HENT:      Head: Normocephalic and atraumatic  Right Ear: External ear normal       Left Ear: External ear normal       Nose: Nose normal       Mouth/Throat:      Pharynx: No oropharyngeal exudate  Eyes:      General: No scleral icterus  Right eye: No discharge  Left eye: No discharge  Pupils: Pupils are equal, round, and reactive to light  Neck:      Thyroid: No thyromegaly  Cardiovascular:      Rate and Rhythm: Normal rate  Heart sounds: Normal heart sounds  No murmur heard  Pulmonary:      Effort: Pulmonary effort is normal  No respiratory distress  Breath sounds: Normal breath sounds  No wheezing  Abdominal:      General: Bowel sounds are normal  There is no distension  Palpations: Abdomen is soft  There is no mass  Tenderness: There is no abdominal tenderness  There is no guarding or rebound  Musculoskeletal:         General: Normal range of motion  Skin:     General: Skin is warm and dry  Findings: No erythema or rash  Neurological:      Mental Status: He is alert        Coordination: Coordination normal       Deep Tendon Reflexes: Reflexes normal    Psychiatric:         " Behavior: Behavior normal            Vision Screening    Right eye Left eye Both eyes   Without correction      With correction 20/25 20/50 20/30           Zuhair Pace DO  [address]  BMI Counseling: Body mass index is 61 72 kg/m²  The BMI is above normal  Nutrition recommendations include reducing portion sizes

## 2023-05-02 DIAGNOSIS — M10.9 GOUT, UNSPECIFIED CAUSE, UNSPECIFIED CHRONICITY, UNSPECIFIED SITE: ICD-10-CM

## 2023-05-03 RX ORDER — ALLOPURINOL 100 MG/1
TABLET ORAL
Qty: 90 TABLET | Refills: 3 | Status: SHIPPED | OUTPATIENT
Start: 2023-05-03

## 2023-06-19 DIAGNOSIS — M10.9 GOUT, UNSPECIFIED CAUSE, UNSPECIFIED CHRONICITY, UNSPECIFIED SITE: ICD-10-CM

## 2023-06-19 RX ORDER — COLCHICINE 0.6 MG/1
0.6 TABLET ORAL DAILY
Qty: 12 TABLET | Refills: 0 | Status: SHIPPED | OUTPATIENT
Start: 2023-06-19

## 2023-07-07 DIAGNOSIS — R10.9 FLANK PAIN: ICD-10-CM

## 2023-07-10 RX ORDER — MELOXICAM 15 MG/1
TABLET ORAL
Qty: 90 TABLET | Refills: 3 | Status: SHIPPED | OUTPATIENT
Start: 2023-07-10

## 2023-09-27 DIAGNOSIS — I10 BENIGN ESSENTIAL HYPERTENSION: ICD-10-CM

## 2023-10-03 ENCOUNTER — TELEPHONE (OUTPATIENT)
Dept: PULMONOLOGY | Facility: MEDICAL CENTER | Age: 53
End: 2023-10-03

## 2023-10-13 RX ORDER — OLMESARTAN MEDOXOMIL 40 MG/1
40 TABLET ORAL DAILY
Qty: 30 TABLET | Refills: 0 | Status: SHIPPED | OUTPATIENT
Start: 2023-10-13

## 2023-10-27 DIAGNOSIS — I10 BENIGN ESSENTIAL HYPERTENSION: ICD-10-CM

## 2023-10-30 RX ORDER — OLMESARTAN MEDOXOMIL 40 MG/1
40 TABLET ORAL DAILY
Qty: 30 TABLET | Refills: 11 | Status: SHIPPED | OUTPATIENT
Start: 2023-10-30

## 2023-12-31 DIAGNOSIS — M10.9 GOUT, UNSPECIFIED CAUSE, UNSPECIFIED CHRONICITY, UNSPECIFIED SITE: ICD-10-CM

## 2024-01-02 RX ORDER — COLCHICINE 0.6 MG/1
0.6 TABLET ORAL DAILY
Qty: 12 TABLET | Refills: 0 | Status: SHIPPED | OUTPATIENT
Start: 2024-01-02

## 2024-01-02 NOTE — TELEPHONE ENCOUNTER
Pt requested via [employer] 12/31/23. Rerouting to pod with high priority. Pt is out of med and is currently having pain from gout.

## 2024-02-28 ENCOUNTER — OFFICE VISIT (OUTPATIENT)
Dept: BARIATRICS | Facility: CLINIC | Age: 54
End: 2024-02-28
Payer: COMMERCIAL

## 2024-02-28 VITALS
HEART RATE: 98 BPM | BODY MASS INDEX: 45.1 KG/M2 | SYSTOLIC BLOOD PRESSURE: 126 MMHG | DIASTOLIC BLOOD PRESSURE: 84 MMHG | WEIGHT: 315 LBS | HEIGHT: 70 IN

## 2024-02-28 DIAGNOSIS — E66.01 CLASS 3 SEVERE OBESITY DUE TO EXCESS CALORIES WITH BODY MASS INDEX (BMI) OF 60.0 TO 69.9 IN ADULT, UNSPECIFIED WHETHER SERIOUS COMORBIDITY PRESENT (HCC): Primary | ICD-10-CM

## 2024-02-28 DIAGNOSIS — G47.33 OBSTRUCTIVE SLEEP APNEA: ICD-10-CM

## 2024-02-28 PROCEDURE — 99204 OFFICE O/P NEW MOD 45 MIN: CPT | Performed by: INTERNAL MEDICINE

## 2024-02-28 NOTE — ASSESSMENT & PLAN NOTE
-Discussed with patient and wife plan as below  -As far as antiobesity medications are concerned, patient would likely need an injectable given his weight loss goals.  Requested patient and wife inquire with insurance to see if Zepbound is covered.  Did inform them that the starting doses of Wegovy and Saxenda are in short supply.  -Discussed oral medication options phentermine topiramate and the need for cardiac monitoring with phentermine along with EKG blood pressure and heart rate.  Will need blood work prior to making any medication decision.  Metformin can be considered in combination therapy after checking fasting insulin and hemoglobin A1c  -Given patient's weight loss goals, presented the option of bariatric surgery as a durable weight loss option.  Did inform the patient that weight loss medications would likely need to be continued lifelong.  Encouraged the patient to consider making an appointment with the bariatric surgery team for consultation.

## 2024-02-28 NOTE — PROGRESS NOTES
Assessment/Plan     Glenn Armenta is 53 y.o. year old adult  who comes in for consultation for assistance with weight management.     - Discussed options of HealthyCORE-Intensive Lifestyle Intervention Program, Very Low Calorie Diet-VLCD, and Conservative Program and the role of weight loss medications.  - Patient is interested in pursuing Conservative Program    Class 3 severe obesity due to excess calories with body mass index (BMI) of 60.0 to 69.9 in adult (HCC)  -Discussed with patient and wife plan as below  -As far as antiobesity medications are concerned, patient would likely need an injectable given his weight loss goals.  Requested patient and wife inquire with insurance to see if Zepbound is covered.  Did inform them that the starting doses of Wegovy and Saxenda are in short supply.  -Discussed oral medication options phentermine topiramate and the need for cardiac monitoring with phentermine along with EKG blood pressure and heart rate.  Will need blood work prior to making any medication decision.  Metformin can be considered in combination therapy after checking fasting insulin and hemoglobin A1c  -Given patient's weight loss goals, presented the option of bariatric surgery as a durable weight loss option.  Did inform the patient that weight loss medications would likely need to be continued lifelong.  Encouraged the patient to consider making an appointment with the bariatric surgery team for consultation.  -Topiramate could be considered for night eating after labs obtained and decision regarding GLP-1's  -Counseled the patient about metabolic dysfunction that can come about due to night eating due to circadian disruption  -Strongly urged patient to visit with the dietitian in order to get a nutrition prescription that he can follow with the protein target and calorie deficit diet  - I discussed with patient and his wife that we will address physical activity as the patient is currently limited due  to bilateral knee pain.  -Discussed moving his bedtime back to ensure adequate duration of sleep and to prevent night to eating.  Patient currently wears a CPAP  Dirk was seen today for consult.    Diagnoses and all orders for this visit:    Class 3 severe obesity due to excess calories with body mass index (BMI) of 60.0 to 69.9 in adult, unspecified whether serious comorbidity present (HCC)  -     CBC and differential; Future  -     Insulin, fasting; Future  -     Lipid panel; Future  -     T3, free; Future  -     TSH, 3rd generation with Free T4 reflex; Future  -     Vitamin D 25 hydroxy; Future    Obstructive sleep apnea            -Please note that this is a rough estimate and the calorie target would be adjusted and the protein target would be provided by the dietitian.    -In addition, please follow general recommendations below.          Return visit:  3 months         General Lifestyle recommendations:    Nutrition   -Avoid skipping meals. Avoid sugary beverages. At least 64oz of water daily.  Limit processed food, refined sugars and grain. Encourage  healthy choices for meals and snacks   -Focus on protein goals and non starchy fiber rich vegetables for satiety effect and to help support a calorie deficit.   - Emphasize portion control, well balanced macronutrient's (protein, carbohydrate, fat using MyPlate method )and adequate protein with each meal/snacks and distributing calories equally throughout the day along with.   -Advise starting the day with a protein breakfast   Behavioral/Stress   Food log via paulino or provided paper log (paulino options include www.[employer] Systemsnesspal.com, sparkpeople.com, loseit.com, calorieking.com, [employer]). Encouraged mindful eating. Be sure to set aside time to eat, eat slowly, and savor your food. Consider meditation apps and/or taking a few minutes of mindfulness every AM. Understand the role of regarding the role of stress hormone cortisol in promoting weight gain and visceral  "fat accumulation. Weigh daily or atleast 2-3 times/ week  Physical Activity   Increase physical activity by 10 minutes daily. Gradually increase physical activity to a goal of 5 days per week for 30 minutes of MODERATE intensity ( should be able to pass the \"talk test\" but should not be able to sing. Target 150-300 minutes  PLUS 2 days per week of FULL BODY resistance training. Progression will be addressed at follow up visits. Encouraged contemplation regarding establishing a daily physical activity routine  - Resistance training along with increase protein intake is important to maintain and enhance metabolism  Sleep   Encourage sleep hygiene and importance of having adequate sleep duration at least > 6 hours to support response in weight loss efforts    Handouts provided :  THRIVE program at [facility]  MyPlate and food quality  Food log resources, phone paulino or paper journal  Antiobesity medications options     - Discussed at length and the role of weight loss medications and medication options   - Explained the importance of making lifestyle changes in addition to starting any anti-obesity medications if the  patient chooses.  -  Initial weight loss goal of 5-10% weight loss for improved health  - Weight loss can improve patient's co-morbid conditions and/or prevent weight-related complications.  - Weight is not at goal and patient has been unable to achieve a meaningful weight loss above 5% using various programs and tools for more than 6 months    Dirk was seen today for consult.    Diagnoses and all orders for this visit:    Class 3 severe obesity due to excess calories with body mass index (BMI) of 60.0 to 69.9 in adult, unspecified whether serious comorbidity present (HCC)  -     CBC and differential; Future  -     Insulin, fasting; Future  -     Lipid panel; Future  -     T3, free; Future  -     TSH, 3rd generation with Free T4 reflex; Future  -     Vitamin D 25 hydroxy; Future    Obstructive sleep " apnea                      Total time spent reviewing chart, interviewing patient, examining patient, discussing plan, answering all questions, and documentin min, with >50% face-to-face time spent counseling patient on nonsurgical interventions for the treatment of excess weight. Discussed in detail nonsurgical options including intensive lifestyle intervention program, very low-calorie diet program and conservative program.  Discussed the role of weight loss medications.  Counseled patient on diet behavior and exercise modification for weight loss.            Lifestyle questionnaire   Was doing keto and fasting for a while and as a result patient reports  No hunger in the morning so he will typically skip breakfast and lunch on start eating from dinner on.  Per wife's report - have a lot of pasta due to daughters food allergies. - large portions at dinner at 6-7 PM   Continues to eat until 12 AM  Patient reports he watches TV 12 AM to 2 AM but tries not to eat after 12 AM  Patient's wife reports that although she tries to keep the 4 processed food out of the house patient orders from [employer] from night eating- - cheeze its, cereal, lunch meats, cheese  -Patient reports that tracking everything on paulino and couple of years ago keto 460--> 400 and recently gained to 430 lbs  - snack after dinner from celery sticks, pice of bread  - Has gout- gout attck too much protein or too much sugar at least two weeks  -Patient feels like routine has provided him success in the past and when he gets out of protein he feels demotivated      Beverages  Water-- 2-3 lts like     Caffeine/tea--none     SSB- diet soda if he goes out    Alcohol: never  Smoking: never  Drug use: never    Physical Activity -- none- has treadmill, weights, exercise bike, knee issues, wont go to the gym to  leaking out urine as he is status post prostatectomy due to prostrate cancer  Sleep --ARIEL on CPAP    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, 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 sites a couple times a week  Psycho social- with wife Nery son 21, oldest 28      Weight history     Start date: 02/28/24  Current weight : 431 lbs  Current BMI: 61.84 kg/m2  Obesity Class: Above 40- Obesity Class III  Goal weight: <300 lbs        Fam hx of obesity- dad    Wt Readings from Last 20 Encounters:   02/28/24 (!) 196 kg (431 lb)   03/29/23 (!) 192 kg (424 lb)   10/04/22 (!) 195 kg (429 lb 3.2 oz)   10/05/21 (!) 188 kg (414 lb)   09/21/21 (!) 185 kg (407 lb)   04/09/21 (!) 190 kg (418 lb)   02/26/21 (!) 184 kg (405 lb)   02/03/21 (!) 190 kg (419 lb 12.8 oz)   11/03/20 (!) 184 kg (405 lb)   05/12/20 (!) 184 kg (405 lb)   03/04/19 (!) 184 kg (405 lb)   11/20/18 (!) 181 kg (400 lb)   09/21/18 (!) 184 kg (405 lb)   09/16/18 (!) 184 kg (405 lb)   12/01/17 (!) 189 kg (416 lb 15.9 oz)   08/24/17 (!) 201 kg (442 lb 15.9 oz)   02/15/17 (!) 202 kg (444 lb 15.9 oz)   02/12/16 (!) 195 kg (429 lb 15.9 oz)   04/12/13 (!) 195 kg (429 lb 15.9 oz)   03/25/13 (!) 195 kg (429 lb 15.9 oz)           Medication considerations/contraindications     -Patient denies personal history of pancreatitis. Patient also denies personal and family history of medullary thyroid cancer and multiple endocrine neoplasia type 2 (MEN 2 tumor). -Patient denies any history of kidney stones, (+seizures related to injury years ago), or glaucoma, diabetic retinopathy, gall bladder disease, hyperthyroidism.  -Denies Hx of CAD, PAD, palpitations, arrhythmia.   -Denies uncontrolled anxiety or depression, suicidal behavior or thinking , insomnia or sleep disturbance.         Past medical history/past surgical history       Previous notes and records have been reviewed.    The following portions of the patient's history were reviewed and updated as appropriate: allergies, current medications, past family history, past medical history, past social history, past surgical history, and problem list.    Past Medical History:  "  Diagnosis Date    Arthritis     Arthropathy 04/06/2011    Last assessed    Fingertip amputation     Gout     Hypertension     Hypotension     resulting from Keto diet per pt    Obesity     Pneumonia     Prostate cancer (HCC)     Seizures (HCC)     Sleep apnea     Tachycardia 03/03/2008    Last assessed    Varicose veins of lower extremity          Past Surgical History:   Procedure Laterality Date    PROSTATECTOMY      SHOULDER SURGERY Left              Family History   Problem Relation Age of Onset    No Known Problems Mother     Coronary artery disease Family     Esophageal cancer Family     Heart disease Family     Mental illness Neg Hx             Objective     /84 (BP Location: Left arm, Patient Position: Sitting, Cuff Size: Large)   Pulse 98   Ht 5' 10\" (1.778 m)   Wt (!) 196 kg (431 lb)   BMI 61.84 kg/m²       Review of Systems   Constitutional:  Negative for fatigue.   HENT:  Negative for sore throat.    Respiratory:  Negative for cough and shortness of breath.    Cardiovascular:  Negative for chest pain, palpitations and leg swelling.   Gastrointestinal:  Negative for abdominal pain, constipation, diarrhea and nausea.   Genitourinary:  Negative for dysuria.   Musculoskeletal:  Negative for arthralgias and back pain.   Skin:  Negative for rash.   Neurological:  Negative for headaches.   Psychiatric/Behavioral:  Negative for dysphoric mood. The patient is not nervous/anxious.        Physical Exam  Vitals and nursing note reviewed.   Constitutional:       Appearance: Normal appearance.   HENT:      Head: Normocephalic.   Neck:      Thyroid: No thyroid mass, thyromegaly or thyroid tenderness.   Cardiovascular:      Rate and Rhythm: Normal rate and regular rhythm.      Pulses: Normal pulses.      Heart sounds: Normal heart sounds.   Pulmonary:      Effort: Pulmonary effort is normal.      Breath sounds: Normal breath sounds.   Abdominal:      General: Abdomen is flat.      Palpations: Abdomen is " soft.   Musculoskeletal:      Cervical back: Normal range of motion and neck supple. No tenderness.   Skin:     General: Skin is warm and dry.   Neurological:      General: No focal deficit present.      Mental Status: He is alert and oriented to person, place, and time.   Psychiatric:         Mood and Affect: Mood normal.         Behavior: Behavior normal.         Thought Content: Thought content normal.         Judgment: Judgment normal.              Medications       Current Outpatient Medications:     allopurinol (ZYLOPRIM) 100 mg tablet, TAKE 1 TABLET BY MOUTH DAILY, Disp: 90 tablet, Rfl: 3    APPLE CIDER VINEGAR PO, Take by mouth, Disp: , Rfl:     diclofenac sodium (VOLTAREN) 1 %, Apply 4 g topically 4 (four) times a day for 90 days, Disp: 1440 g, Rfl: 0    meloxicam (MOBIC) 15 mg tablet, TAKE 1 TABLET BY MOUTH DAILY, Disp: 90 tablet, Rfl: 3    olmesartan (BENICAR) 40 mg tablet, TAKE 1 TABLET BY MOUTH DAILY, Disp: 30 tablet, Rfl: 11    colchicine (COLCRYS) 0.6 mg tablet, Take 1 tablet (0.6 mg total) by mouth daily (Patient not taking: Reported on 2/28/2024), Disp: 12 tablet, Rfl: 0           Labs and imaging     Recent labs and imaging have been personally reviewed.  Lab Results   Component Value Date    WBC 13.06 (H) 09/16/2018    HGB 14.2 09/16/2018    HCT 42.5 09/16/2018    MCV 92 09/16/2018     09/16/2018     Lab Results   Component Value Date     01/19/2018    SODIUM 140 10/20/2021    K 4.4 10/20/2021     10/20/2021    CO2 21 10/20/2021    AGAP 10 09/16/2018    BUN 18 10/20/2021    CREATININE 0.95 10/20/2021    GLUC 111 (H) 10/20/2021    CALCIUM 9.2 09/16/2018    AST 14 10/20/2021    ALT 22 10/20/2021    ALKPHOS 79 09/16/2018    PROT 7.6 01/19/2018    TP 7.6 10/20/2021    BILITOT 0.3 01/19/2018    TBILI 0.4 10/20/2021    EGFR 91 09/16/2018     Lab Results   Component Value Date    HGBA1C 5.7 (H) 12/22/2020     Lab Results   Component Value Date    KHX4UDTHHNWI 0.998 12/04/2017     Lab  Results   Component Value Date    CHOLESTEROL 194 10/20/2021     Lab Results   Component Value Date    HDL 49 10/20/2021     Lab Results   Component Value Date    TRIG 79 10/20/2021     Lab Results   Component Value Date    LDLCALC 131 (H) 10/20/2021

## 2024-03-09 DIAGNOSIS — M10.9 GOUT, UNSPECIFIED CAUSE, UNSPECIFIED CHRONICITY, UNSPECIFIED SITE: ICD-10-CM

## 2024-03-11 RX ORDER — COLCHICINE 0.6 MG/1
0.6 TABLET ORAL DAILY
Qty: 12 TABLET | Refills: 0 | Status: SHIPPED | OUTPATIENT
Start: 2024-03-11

## 2024-03-27 ENCOUNTER — TELEPHONE (OUTPATIENT)
Dept: BARIATRICS | Facility: CLINIC | Age: 54
End: 2024-03-27

## 2024-04-03 DIAGNOSIS — M10.9 GOUT, UNSPECIFIED CAUSE, UNSPECIFIED CHRONICITY, UNSPECIFIED SITE: ICD-10-CM

## 2024-04-09 RX ORDER — ALLOPURINOL 100 MG/1
TABLET ORAL
Qty: 90 TABLET | Refills: 3 | Status: SHIPPED | OUTPATIENT
Start: 2024-04-09

## 2024-06-01 LAB
25(OH)D3+25(OH)D2 SERPL-MCNC: 13.9 NG/ML (ref 30–100)
BASOPHILS # BLD AUTO: 0.1 X10E3/UL (ref 0–0.2)
BASOPHILS NFR BLD AUTO: 1 %
CHOLEST SERPL-MCNC: 204 MG/DL (ref 100–199)
EOSINOPHIL # BLD AUTO: 0.2 X10E3/UL (ref 0–0.4)
EOSINOPHIL NFR BLD AUTO: 4 %
ERYTHROCYTE [DISTWIDTH] IN BLOOD BY AUTOMATED COUNT: 12.6 % (ref 11.6–15.4)
HCT VFR BLD AUTO: 44.8 % (ref 37.5–51)
HDLC SERPL-MCNC: 55 MG/DL
HGB BLD-MCNC: 15.3 G/DL (ref 13–17.7)
IMM GRANULOCYTES # BLD: 0.1 X10E3/UL (ref 0–0.1)
IMM GRANULOCYTES NFR BLD: 1 %
INSULIN SERPL-ACNC: 55.4 UIU/ML (ref 2.6–24.9)
LDLC SERPL CALC-MCNC: 126 MG/DL (ref 0–99)
LYMPHOCYTES # BLD AUTO: 1.6 X10E3/UL (ref 0.7–3.1)
LYMPHOCYTES NFR BLD AUTO: 25 %
MCH RBC QN AUTO: 31.4 PG (ref 26.6–33)
MCHC RBC AUTO-ENTMCNC: 34.2 G/DL (ref 31.5–35.7)
MCV RBC AUTO: 92 FL (ref 79–97)
MONOCYTES # BLD AUTO: 0.6 X10E3/UL (ref 0.1–0.9)
MONOCYTES NFR BLD AUTO: 10 %
NEUTROPHILS # BLD AUTO: 3.8 X10E3/UL (ref 1.4–7)
NEUTROPHILS NFR BLD AUTO: 59 %
PLATELET # BLD AUTO: 248 X10E3/UL (ref 150–450)
RBC # BLD AUTO: 4.88 X10E6/UL (ref 4.14–5.8)
SL AMB VLDL CHOLESTEROL CALC: 23 MG/DL (ref 5–40)
T3FREE SERPL-MCNC: 3.1 PG/ML (ref 2–4.4)
TRIGL SERPL-MCNC: 129 MG/DL (ref 0–149)
TSH SERPL DL<=0.005 MIU/L-ACNC: 1.23 UIU/ML (ref 0.45–4.5)
WBC # BLD AUTO: 6.4 X10E3/UL (ref 3.4–10.8)

## 2024-06-10 DIAGNOSIS — R10.9 FLANK PAIN: ICD-10-CM

## 2024-06-11 RX ORDER — MELOXICAM 15 MG/1
TABLET ORAL
Qty: 90 TABLET | Refills: 3 | Status: SHIPPED | OUTPATIENT
Start: 2024-06-11

## 2024-06-11 NOTE — TELEPHONE ENCOUNTER
Refill must be reviewed and completed by the office or provider. The refill is unable to be approved or denied by the medication management team.    Patient not seen since 3/29/23

## 2024-06-19 ENCOUNTER — TELEPHONE (OUTPATIENT)
Age: 54
End: 2024-06-19

## 2024-06-19 DIAGNOSIS — M10.9 GOUT, UNSPECIFIED CAUSE, UNSPECIFIED CHRONICITY, UNSPECIFIED SITE: ICD-10-CM

## 2024-06-19 RX ORDER — COLCHICINE 0.6 MG/1
0.6 TABLET ORAL DAILY
Qty: 12 TABLET | Refills: 0 | Status: SHIPPED | OUTPATIENT
Start: 2024-06-19

## 2024-06-19 NOTE — TELEPHONE ENCOUNTER
Patient is looking to have diclofenac sodium 1 % filled. Please advise in Dr. Lombardi's absence.  Nadine Elaine, CMA

## 2024-06-19 NOTE — TELEPHONE ENCOUNTER
I spoke to the patient and informed him.     He also asked about his colchicine being refilled. I informed him that he is due for a physical. I scheduled him for his physical 8/21/24. Please send refill to last until CPE.     Dian Nieto LPN

## 2024-06-19 NOTE — TELEPHONE ENCOUNTER
Dirk Armenta   to [facility] (supporting Frank Lombardi, DO)         6/19/24 12:58 PM  Can i get this refilled with my online pharmacy?   I dont use 5his alot but it works great when i need it and am in pain

## 2024-06-24 DIAGNOSIS — M10.9 GOUT, UNSPECIFIED CAUSE, UNSPECIFIED CHRONICITY, UNSPECIFIED SITE: ICD-10-CM

## 2024-06-25 RX ORDER — COLCHICINE 0.6 MG/1
0.6 TABLET ORAL DAILY
Qty: 12 TABLET | Refills: 0 | OUTPATIENT
Start: 2024-06-25

## 2024-07-10 DIAGNOSIS — R10.9 FLANK PAIN: ICD-10-CM

## 2024-07-10 DIAGNOSIS — M10.9 GOUT, UNSPECIFIED CAUSE, UNSPECIFIED CHRONICITY, UNSPECIFIED SITE: ICD-10-CM

## 2024-07-10 DIAGNOSIS — I10 BENIGN ESSENTIAL HYPERTENSION: ICD-10-CM

## 2024-07-11 DIAGNOSIS — M10.9 GOUT, UNSPECIFIED CAUSE, UNSPECIFIED CHRONICITY, UNSPECIFIED SITE: ICD-10-CM

## 2024-07-11 RX ORDER — OLMESARTAN MEDOXOMIL 40 MG/1
40 TABLET ORAL DAILY
Qty: 30 TABLET | Refills: 11 | Status: SHIPPED | OUTPATIENT
Start: 2024-07-11

## 2024-07-11 RX ORDER — ALLOPURINOL 100 MG/1
100 TABLET ORAL DAILY
Qty: 100 TABLET | Refills: 1 | Status: SHIPPED | OUTPATIENT
Start: 2024-07-11

## 2024-07-11 RX ORDER — MELOXICAM 15 MG/1
15 TABLET ORAL DAILY
Qty: 90 TABLET | Refills: 3 | Status: SHIPPED | OUTPATIENT
Start: 2024-07-11

## 2024-07-12 RX ORDER — COLCHICINE 0.6 MG/1
0.6 TABLET ORAL DAILY
Qty: 12 TABLET | Refills: 0 | Status: SHIPPED | OUTPATIENT
Start: 2024-07-12

## 2024-08-06 DIAGNOSIS — I10 BENIGN ESSENTIAL HYPERTENSION: ICD-10-CM

## 2024-08-06 DIAGNOSIS — M10.9 GOUT, UNSPECIFIED CAUSE, UNSPECIFIED CHRONICITY, UNSPECIFIED SITE: ICD-10-CM

## 2024-08-06 DIAGNOSIS — R10.9 FLANK PAIN: ICD-10-CM

## 2024-08-06 NOTE — TELEPHONE ENCOUNTER
This is a pharmacy change for these meds**    Reason for call:   [x] Refill   [] Prior Auth  [] Other:     Office:   [x] PCP/Provider -   [] Specialty/Provider -     Medication: allopurinol (ZYLOPRIM) 100 mg  1 tablety daily   meloxicam (MOBIC) 15 mg 1 tablet daily   olmesartan (BENICAR) 40 mg 1 tablet daily   colchicine (COLCRYS) 0.6 mg 1 tablet daily         Pharmacy: [facility] [place]     Does the patient have enough for 3 days?   [x] Yes   [] No - Send as HP to POD

## 2024-08-07 RX ORDER — ALLOPURINOL 100 MG/1
100 TABLET ORAL DAILY
Qty: 100 TABLET | Refills: 1 | Status: SHIPPED | OUTPATIENT
Start: 2024-08-07

## 2024-08-07 RX ORDER — OLMESARTAN MEDOXOMIL 40 MG/1
40 TABLET ORAL DAILY
Qty: 100 TABLET | Refills: 1 | Status: SHIPPED | OUTPATIENT
Start: 2024-08-07

## 2024-08-07 RX ORDER — COLCHICINE 0.6 MG/1
0.6 TABLET ORAL DAILY
Qty: 30 TABLET | Refills: 2 | Status: SHIPPED | OUTPATIENT
Start: 2024-08-07

## 2024-08-07 RX ORDER — MELOXICAM 15 MG/1
15 TABLET ORAL DAILY
Qty: 100 TABLET | Refills: 1 | Status: SHIPPED | OUTPATIENT
Start: 2024-08-07

## 2024-08-21 ENCOUNTER — OFFICE VISIT (OUTPATIENT)
Dept: FAMILY MEDICINE CLINIC | Facility: CLINIC | Age: 54
End: 2024-08-21
Payer: COMMERCIAL

## 2024-08-21 VITALS
HEART RATE: 96 BPM | WEIGHT: 315 LBS | HEIGHT: 69 IN | DIASTOLIC BLOOD PRESSURE: 86 MMHG | BODY MASS INDEX: 46.65 KG/M2 | TEMPERATURE: 97.1 F | RESPIRATION RATE: 18 BRPM | SYSTOLIC BLOOD PRESSURE: 136 MMHG

## 2024-08-21 DIAGNOSIS — R74.8 ELEVATED LIVER ENZYMES: ICD-10-CM

## 2024-08-21 DIAGNOSIS — R73.09 ABNORMAL GLUCOSE: ICD-10-CM

## 2024-08-21 DIAGNOSIS — Z23 NEED FOR VACCINATION: ICD-10-CM

## 2024-08-21 DIAGNOSIS — E66.01 CLASS 3 SEVERE OBESITY DUE TO EXCESS CALORIES WITH BODY MASS INDEX (BMI) OF 60.0 TO 69.9 IN ADULT, UNSPECIFIED WHETHER SERIOUS COMORBIDITY PRESENT (HCC): ICD-10-CM

## 2024-08-21 DIAGNOSIS — I10 BENIGN ESSENTIAL HYPERTENSION: ICD-10-CM

## 2024-08-21 DIAGNOSIS — C61 ADENOCARCINOMA OF PROSTATE (HCC): ICD-10-CM

## 2024-08-21 DIAGNOSIS — Z12.11 SCREEN FOR COLON CANCER: ICD-10-CM

## 2024-08-21 DIAGNOSIS — E78.2 MIXED HYPERLIPIDEMIA: ICD-10-CM

## 2024-08-21 DIAGNOSIS — Z00.00 WELL ADULT EXAM: Primary | ICD-10-CM

## 2024-08-21 DIAGNOSIS — E55.9 VITAMIN D DEFICIENCY: ICD-10-CM

## 2024-08-21 DIAGNOSIS — Z12.5 SCREENING FOR PROSTATE CANCER: ICD-10-CM

## 2024-08-21 PROCEDURE — 90471 IMMUNIZATION ADMIN: CPT

## 2024-08-21 PROCEDURE — 99396 PREV VISIT EST AGE 40-64: CPT | Performed by: FAMILY MEDICINE

## 2024-08-21 PROCEDURE — 90715 TDAP VACCINE 7 YRS/> IM: CPT

## 2024-08-21 NOTE — PROGRESS NOTES
FAMILY PRACTICE HEALTH MAINTENANCE OFFICE VISIT  [facility]    NAME: Glenn Armenta  AGE: 54 y.o. SEX: adult  : 1970     DATE: 2024    Assessment and Plan     1. Well adult exam  2. Abnormal glucose  -     Comprehensive metabolic panel; Future; Expected date: 2025  -     CBC; Future  -     Lipid Panel with Direct LDL reflex; Future; Expected date: 2025  -     Hemoglobin A1C; Future; Expected date: 2025  -     Comprehensive metabolic panel  -     CBC  -     Lipid Panel with Direct LDL reflex  -     Hemoglobin A1C  3. Benign essential hypertension  -     Comprehensive metabolic panel; Future; Expected date: 2025  -     CBC; Future  -     Lipid Panel with Direct LDL reflex; Future; Expected date: 2025  -     Hemoglobin A1C; Future; Expected date: 2025  -     Comprehensive metabolic panel  -     CBC  -     Lipid Panel with Direct LDL reflex  -     Hemoglobin A1C  4. Elevated liver enzymes  5. Mixed hyperlipidemia  Assessment & Plan:  Pt states he has changed his diet and is on low carb diet for a number of weeks  Is also seeing a weight mgmt doctor  Pt has to do more activity for his work - walking  Does NOT want to start meds for the lipids at this point  Orders:  -     Comprehensive metabolic panel; Future; Expected date: 2025  -     CBC; Future  -     Lipid Panel with Direct LDL reflex; Future; Expected date: 2025  -     Hemoglobin A1C; Future; Expected date: 2025  -     Comprehensive metabolic panel  -     CBC  -     Lipid Panel with Direct LDL reflex  -     Hemoglobin A1C  6. Adenocarcinoma of prostate (HCC)  Assessment & Plan:  Pt states he was told he did not need to see urology any longer  -- follow with family doctor.  Pt states he was told he needed a PSA once a year  7. Vitamin D deficiency  8. Class 3 severe obesity due to excess calories with body mass index (BMI) of 60.0 to 69.9 in adult, unspecified  whether serious comorbidity present (HCC)  Assessment & Plan:  Pt states his ins does cover this and the weight loss doctor is going to write that for him  9. Screen for colon cancer  -     Ambulatory referral to Gastroenterology; Future  10. Need for vaccination  -     TDAP VACCINE GREATER THAN OR EQUAL TO 8YO IM  11. Screening for prostate cancer  -     PSA, Total Screen; Future      Patient Counseling:   Nutrition: Stressed importance of a well balanced diet, moderation of sodium/saturated fat, caloric balance and sufficient intake of fiber  Exercise: Stressed the importance of regular exercise with a goal of 150 minutes per week  Dental Health: Discussed daily flossing and brushing and regular dental visits     Immunizations reviewed: See Orders  Discussed benefits of:  Colon Cancer Screening, Prostate Cancer Screening , and Screening labs.  BMI Counseling: Body mass index is 63.92 kg/m². Discussed with patient's BMI with him. The BMI is above normal. Nutrition recommendations include reducing portion sizes.    No follow-ups on file.        Chief Complaint     Chief Complaint   Patient presents with   • Physical Exam     Sas/cma       History of Present Illness     Pt is sched for a full physical  Pt is no longer seeing Urology.            Well Adult Physical   Patient here for a comprehensive physical exam.      Diet and Physical Activity  Diet: low fat diet  Exercise: occasionally      Depression Screen  PHQ-2/9 Depression Screening    Little interest or pleasure in doing things: 0 - not at all  Feeling down, depressed, or hopeless: 0 - not at all  PHQ-2 Score: 0  PHQ-2 Interpretation: Negative depression screen          General Health  Hearing: Slighty decreased: bilateral  Vision: wears glasses and wears contacts  Dental: regular dental visits    Reproductive Health  No issues       The following portions of the patient's history were reviewed and updated as appropriate: allergies, current medications, past  family history, past medical history, past social history, past surgical history and problem list.    Review of Systems     Review of Systems   Constitutional:  Negative for activity change, appetite change, chills, diaphoresis, fatigue, fever and unexpected weight change.   HENT:  Negative for congestion, dental problem, ear pain, mouth sores, sinus pressure, sinus pain, sore throat and trouble swallowing.    Eyes:  Negative for photophobia, discharge and itching.   Respiratory:  Negative for apnea, chest tightness and shortness of breath.    Cardiovascular:  Negative for chest pain, palpitations and leg swelling.   Gastrointestinal:  Negative for abdominal distention, abdominal pain, blood in stool, nausea and vomiting.   Endocrine: Negative for cold intolerance, heat intolerance, polydipsia, polyphagia and polyuria.   Genitourinary:  Negative for difficulty urinating.   Musculoskeletal:  Negative for arthralgias.   Skin:  Negative for color change and wound.   Neurological:  Negative for dizziness, syncope, speech difficulty and headaches.   Hematological:  Negative for adenopathy.   Psychiatric/Behavioral:  Negative for agitation and behavioral problems.        Past Medical History     Past Medical History:   Diagnosis Date   • Arthritis    • Arthropathy 04/06/2011    Last assessed   • Fingertip amputation    • Gout    • Hypertension    • Hypotension     resulting from Keto diet per pt   • Obesity    • Pneumonia    • Prostate cancer (HCC)    • Seizures (HCC)    • Sleep apnea    • Tachycardia 03/03/2008    Last assessed   • Varicose veins of lower extremity        Past Surgical History     Past Surgical History:   Procedure Laterality Date   • PROSTATECTOMY     • SHOULDER SURGERY Left        Social History     Social History     Socioeconomic History   • Marital status: /Civil Union     Spouse name: None   • Number of children: None   • Years of education: None   • Highest education level: None  "  Occupational History   • None   Tobacco Use   • Smoking status: Never     Passive exposure: Never   • Smokeless tobacco: Never   Vaping Use   • Vaping status: Never Used   Substance and Sexual Activity   • Alcohol use: No   • Drug use: Never   • Sexual activity: None   Other Topics Concern   • None   Social History Narrative   • None     Social Determinants of Health     Financial Resource Strain: Not on file   Food Insecurity: Not on file   Transportation Needs: Not on file   Physical Activity: Not on file   Stress: Not on file   Social Connections: Not on file   Intimate Partner Violence: Not on file   Housing Stability: Not on file       Family History     Family History   Problem Relation Age of Onset   • Stroke Mother    • Arrhythmia Mother    • Valvular heart disease Mother    • Coronary artery disease Father    • Coronary artery disease Family    • Esophageal cancer Family    • Heart disease Family    • Mental illness Neg Hx        Current Medications       Current Outpatient Medications:   •  allopurinol (ZYLOPRIM) 100 mg tablet, Take 1 tablet (100 mg total) by mouth daily, Disp: 100 tablet, Rfl: 1  •  APPLE CIDER VINEGAR PO, Take by mouth, Disp: , Rfl:   •  colchicine (COLCRYS) 0.6 mg tablet, Take 1 tablet (0.6 mg total) by mouth daily, Disp: 30 tablet, Rfl: 2  •  diclofenac sodium (VOLTAREN) 1 %, Apply 4 g topically 4 (four) times a day for 90 days, Disp: 1440 g, Rfl: 0  •  meloxicam (MOBIC) 15 mg tablet, Take 1 tablet (15 mg total) by mouth daily, Disp: 100 tablet, Rfl: 1  •  olmesartan (BENICAR) 40 mg tablet, Take 1 tablet (40 mg total) by mouth daily, Disp: 100 tablet, Rfl: 1     Allergies     No Known Allergies    Objective     /86   Pulse 96   Temp (!) 97.1 °F (36.2 °C)   Resp 18   Ht 5' 9.25\" (1.759 m)   Wt (!) 198 kg (436 lb)   BMI 63.92 kg/m²      Physical Exam  Vitals and nursing note reviewed.   Constitutional:       General: He is not in acute distress.     Appearance: He is " well-developed. He is obese. He is not diaphoretic.   HENT:      Head: Normocephalic and atraumatic.      Right Ear: External ear normal.      Left Ear: External ear normal.      Nose: Nose normal.      Mouth/Throat:      Pharynx: No oropharyngeal exudate.   Eyes:      General: No scleral icterus.        Right eye: No discharge.         Left eye: No discharge.      Pupils: Pupils are equal, round, and reactive to light.   Neck:      Thyroid: No thyromegaly.   Cardiovascular:      Rate and Rhythm: Normal rate.      Heart sounds: Normal heart sounds. No murmur heard.  Pulmonary:      Effort: Pulmonary effort is normal. No respiratory distress.      Breath sounds: Normal breath sounds. No wheezing.   Abdominal:      General: Bowel sounds are normal. There is no distension.      Palpations: Abdomen is soft. There is no mass.      Tenderness: There is no abdominal tenderness. There is no guarding or rebound.   Musculoskeletal:         General: Normal range of motion.   Skin:     General: Skin is warm and dry.      Findings: No erythema or rash.   Neurological:      Mental Status: He is alert.      Coordination: Coordination normal.      Deep Tendon Reflexes: Reflexes normal.   Psychiatric:         Behavior: Behavior normal.           Vision Screening    Right eye Left eye Both eyes   Without correction      With correction 20/20 20/20 20/20           Frank Lombardi, DO [facility]

## 2024-08-21 NOTE — ASSESSMENT & PLAN NOTE
Pt states he has changed his diet and is on low carb diet for a number of weeks  Is also seeing a weight mgmt doctor  Pt has to do more activity for his work - walking  Does NOT want to start meds for the lipids at this point

## 2024-08-21 NOTE — ASSESSMENT & PLAN NOTE
Pt states he was told he did not need to see urology any longer  -- follow with family doctor.  Pt states he was told he needed a PSA once a year

## 2024-08-26 ENCOUNTER — TELEPHONE (OUTPATIENT)
Age: 54
End: 2024-08-26

## 2024-08-26 NOTE — TELEPHONE ENCOUNTER
Patient called to make sure 3 of his 4 prescription went to [facility] pharmacy Receipt confirmed by pharmacy (8/7/2024  8:16 AM EDT)

## 2024-08-27 ENCOUNTER — OFFICE VISIT (OUTPATIENT)
Dept: BARIATRICS | Facility: CLINIC | Age: 54
End: 2024-08-27
Payer: COMMERCIAL

## 2024-08-27 VITALS
HEART RATE: 102 BPM | DIASTOLIC BLOOD PRESSURE: 100 MMHG | WEIGHT: 315 LBS | SYSTOLIC BLOOD PRESSURE: 130 MMHG | BODY MASS INDEX: 45.1 KG/M2 | HEIGHT: 70 IN

## 2024-08-27 DIAGNOSIS — E66.01 CLASS 3 SEVERE OBESITY DUE TO EXCESS CALORIES WITH BODY MASS INDEX (BMI) OF 60.0 TO 69.9 IN ADULT, UNSPECIFIED WHETHER SERIOUS COMORBIDITY PRESENT (HCC): Primary | ICD-10-CM

## 2024-08-27 PROCEDURE — 99215 OFFICE O/P EST HI 40 MIN: CPT | Performed by: INTERNAL MEDICINE

## 2024-08-27 RX ORDER — TIRZEPATIDE 2.5 MG/.5ML
2.5 INJECTION, SOLUTION SUBCUTANEOUS WEEKLY
Qty: 2 ML | Refills: 0 | Status: SHIPPED | OUTPATIENT
Start: 2024-08-27 | End: 2024-10-22

## 2024-08-27 NOTE — PROGRESS NOTES
Program: [facility]-Intensive Lifestyle Intervention Program, Very Low Calorie Diet-VLCD, and Conservative Program    Assessment/Plan     Glenn Armenta  is a 54 y.o. adult with  returns to follow up  for treatment of excess body weight and associated risk factors/co-morbidities.     Class 3 severe obesity due to excess calories with body mass index (BMI) of 60.0 to 69.9 in adult (HCC)  Patient reports that he follows a ketogenic diet sometimes skips both his breakfast and lunch meals as he is in meetings all day.  He eats 1-2 meals a day but mostly one with protein and sometimes includes vegetables.  He later snacks into the night on meat and cheese prior  to bed late into the night until 2 AM.  I discussed evenly distributing his calories and shifting his eating towards day, meal prepping and keeping food on hand such as even meal replacement shakes or calorie and portion controlled dinners.  Patient was initially reluctant to meet with the dietitian. Patient's insurance has changed and he would like to initiate injectable medication.  I also informed him that even with the injectable medication which has its limits and may not be sufficient to get him to his weight loss goals.  Patient verbalized understanding but declined bariatric surgery as this was presented as a durable weight loss option.  I discussed the importance of nourishing has body appropriately while on injectable medication as the patient.  Did discuss for the patient to incorporate more vegetables along with protein and choosing healthy fats and work with the dietitian to balance his nutrition.   Instructed him to meet with the dietitian and initiate nutritional plan prior to initiating Zepbound ,prescription for which has been submitted. Metformin can be considered in combination therapy as his labs indicate insulin resistance fasting insulin of 55.- Did inform the patient that weight loss medications would likely need to be continued lifelong.  " Advised him to not skip meals as it perpetuates the cycle of his consuming most of his calories towards the end of the day along with nighttime eating.  Discussed with patient that the bodies protein needs cannot be met with 1 meal a day;  encouraged the patient to consider making an appointment with the bariatric surgery team for consultation.  Patient declines at this time.  Other medication options  -Topiramate could be considered for night eating   -Counseled the patient about metabolic dysfunction that can come about due to night eating due to circadian disruption which is evident by elevated fasting insulin.  /100.  Would avoid phentermine and Qsymia.  Patient has not been moving his bedtime back to prevent night eating as previously discussed at initial consult in February.  Patient's activity is also limited due to physical limitations from obesity.  Would generally advise decreasing sedentary behavior is much as possible and to consider a recumbent bike chair yoga resistance bands for strength training       Most recent notes and records were reviewed.         Return visit:  2-3 months     Nutrition   Do not skip meals. Avoid sugary beverages. At least 64oz of water daily.    Behavioral/Stress   Food log via paulino or provided paper log (paulino options include www.Orabrush.com, sparkpeople.com, loseit.com, calorieking.com, [employer]). Encouraged mindful eating    Physical Activity  Increase physical activity by 10 minutes daily. Gradually increase physical activity to a goal of 5 days per week for 30 minutes of MODERATE intensity ( ( should be able to pass the \"talk test\" but should not be able to sing.  target 150-300 minutes  PLUS 2-3 days per week of FULL BODY resistance training. Progression will be addressed at follow up visits. Encouraged planning regarding establishing physical activity routine    Sleep  Provided sleep hygiene counseling and importance of having adequate sleep " renetta Cavazos was seen today for follow-up.    Diagnoses and all orders for this visit:    Class 3 severe obesity due to excess calories with body mass index (BMI) of 60.0 to 69.9 in adult, unspecified whether serious comorbidity present (HCC)  -     tirzepatide (Zepbound) 2.5 mg/0.5 mL auto-injector; Inject 0.5 mL (2.5 mg total) under the skin once a week          Zepbound Instructions:    - Begin Zepbound 2.5 mg subcutaneously once a week. Dose changes may occur after 4 doses if medication is tolerated. You will be assessed prior to each dose change to make sure you are tolerating the medication well.  - Please message me when you have 2 pens left from the prescription so there are no lapses in treatment.  - Visit Zepbound.com for further information/injection instructions.   - Take precautions to avoid dehydration. Aim for 64-80 oz of fluids/day  -Stop eating before you feel full  -Instructed  to administer a missed dose as soon as possible within 4 days. If more than 4 days have passed, skip the missed dose, administer the next dose on the regularly scheduled day, and resume the regular once-weekly dosing schedule  -Please eat small frequent meals to help reduce nausea. Avoid excessively fatty meals fried foods. Lemon water and saltine crackers may help with this.   - Side effects of Zepbound discussed: nausea, vomiting, diarrhea, and constipation. If you experience fever, nausea/vomiting, and pain radiating to your back this may be a sign of pancreatitis. Please go to the emergency room if this occurs.  - Consider OTC bowel regimen including stool softeners, fiber supplements to regularize bowel movements while on medication. MiraLAX can be used if needed  - - If on oral birth control a 2nd method of birth control is recommended during the 1st 8 weeks of therapy and for 4 weeks after any dosage change.   - Patient understands the side effects of the medication and proper administration. Patient agrees  with the treatment plan and all questions were answered.    Subcutaneous injection:  Inject subQ into the thigh, abdomen, or upper arm; rotate injection sites.  Administer at any time of day, with or without meals.  Administer separately from insulin (do not mix the products); may inject both medications in the same body region but not adjacent to each other.  The day of the weekly administration can be changed as long as the time between the 2 doses is at least 3 days (72 hours)  Administer a missed dose as soon as possible within 4 days (96 hours) of missed dose; if more than 4 days have passed, skip the missed dose and administer next dose on regularly scheduled day and resume regular once weekly dosing schedule       Total time spent reviewing chart, interviewing patient, examining patient, discussing plan, answering all questions, and documentin minutes with >50% face-to-face time with the patient.            Weight trajectory     Wt Readings from Last 20 Encounters:   24 (!) 197 kg (433 lb 9.6 oz)   24 (!) 198 kg (436 lb)   24 (!) 196 kg (431 lb)   23 (!) 192 kg (424 lb)   10/04/22 (!) 195 kg (429 lb 3.2 oz)   10/05/21 (!) 188 kg (414 lb)   21 (!) 185 kg (407 lb)   21 (!) 190 kg (418 lb)   21 (!) 184 kg (405 lb)   21 (!) 190 kg (419 lb 12.8 oz)   20 (!) 184 kg (405 lb)   20 (!) 184 kg (405 lb)   19 (!) 184 kg (405 lb)   18 (!) 181 kg (400 lb)   18 (!) 184 kg (405 lb)   18 (!) 184 kg (405 lb)   17 (!) 189 kg (416 lb 15.9 oz)   17 (!) 201 kg (442 lb 15.9 oz)   02/15/17 (!) 202 kg (444 lb 15.9 oz)   16 (!) 195 kg (429 lb 15.9 oz)               Weight/ Lifestyle history/HPI     Patient reports   Keto for last month insurance changed and wants to try the injectable.  Still eats late into the night until 2 AM.  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 a couple times a week  Psycho social-lives with wife Nery son 21, oldest 28               Start date: 02/28/24  Current weight : 431 lbs  Current BMI: 61.84 kg/m2  Obesity Class: Above 40- Obesity Class III  Goal weight: <300 lbs          Current Weight on 8/27/24 : 433 lbs  Current BMI : 62.2 kg/m2 Above 40- Obesity Class III  Difference: +2 lbs      Anti obesity Medications/ Medical---    Weight loss medication and dose: None  Date initiated:               Objective         The following portions of the patient's history were reviewed and updated as appropriate: allergies, current medications, past family history, past medical history, past social history, past surgical history, and problem list.      /100   Pulse 102   Ht 5' 10\" (1.778 m)   Wt (!) 197 kg (433 lb 9.6 oz)   BMI 62.22 kg/m²             Review of Systems   Constitutional:  Negative for fatigue.   HENT:  Negative for sore throat.    Respiratory:  Negative for cough and shortness of breath.    Cardiovascular:  Negative for chest pain, palpitations and leg swelling.   Gastrointestinal:  Negative for abdominal pain, constipation, diarrhea and nausea.   Genitourinary:  Negative for dysuria.   Musculoskeletal:  Negative for arthralgias and back pain.   Skin:  Negative for rash.   Neurological:  Negative for headaches.   Psychiatric/Behavioral:  Negative for dysphoric mood. The patient is not nervous/anxious.          Physical Exam  Vitals and nursing note reviewed. "   Constitutional:       Appearance: Normal appearance.   HENT:      Head: Normocephalic.   Pulmonary:      Effort: Pulmonary effort is normal.   Neurological:      General: No focal deficit present.      Mental Status: She is alert and oriented to person, place, and time.   Psychiatric:         Mood and Affect: Mood normal.         Behavior: Behavior normal.         Thought Content: Thought content normal.         Judgment: Judgment normal.          Current medications       Current Outpatient Medications:     allopurinol (ZYLOPRIM) 100 mg tablet, Take 1 tablet (100 mg total) by mouth daily, Disp: 100 tablet, Rfl: 1    APPLE CIDER VINEGAR PO, Take by mouth, Disp: , Rfl:     colchicine (COLCRYS) 0.6 mg tablet, Take 1 tablet (0.6 mg total) by mouth daily, Disp: 30 tablet, Rfl: 2    meloxicam (MOBIC) 15 mg tablet, Take 1 tablet (15 mg total) by mouth daily, Disp: 100 tablet, Rfl: 1    olmesartan (BENICAR) 40 mg tablet, Take 1 tablet (40 mg total) by mouth daily, Disp: 100 tablet, Rfl: 1    tirzepatide (Zepbound) 2.5 mg/0.5 mL auto-injector, Inject 0.5 mL (2.5 mg total) under the skin once a week, Disp: 2 mL, Rfl: 0    diclofenac sodium (VOLTAREN) 1 %, Apply 4 g topically 4 (four) times a day for 90 days, Disp: 1440 g, Rfl: 0         Medication considerations/contraindications     -Patient denies personal history of pancreatitis. Patient also denies personal and family history of medullary thyroid cancer, and multiple endocrine neoplasia type 2 (MEN 2 tumor). -Patient denies any history of kidney stones, seizures, or glaucoma, diabetic retinopathy, gall bladder disease, gastroparesis, hyperthyroidism.  -Denies Hx of CAD, PAD, palpitations, arrhythmia, uncontrolled hypertension  -Denies uncontrolled anxiety or depression, suicidal behavior or thinking , insomnia or sleep disturbance.         Labs     Most recent labs reviewed   Lab Results   Component Value Date     01/19/2018    SODIUM 140 10/20/2021    K 4.4  "10/20/2021     10/20/2021    CO2 21 10/20/2021    AGAP 10 09/16/2018    BUN 18 10/20/2021    CREATININE 0.95 10/20/2021    GLUC 111 (H) 10/20/2021    CALCIUM 9.2 09/16/2018    AST 14 10/20/2021    ALT 22 10/20/2021    ALKPHOS 79 09/16/2018    PROT 7.6 01/19/2018    TP 7.6 10/20/2021    BILITOT 0.3 01/19/2018    TBILI 0.4 10/20/2021    EGFR 91 09/16/2018     Lab Results   Component Value Date    HGBA1C 5.7 (H) 12/22/2020     Lab Results   Component Value Date    TVP8QLVPXRNR 0.998 12/04/2017    TSH 1.230 05/31/2024     Lab Results   Component Value Date    CHOLESTEROL 204 (H) 05/31/2024     Lab Results   Component Value Date    HDL 55 05/31/2024     Lab Results   Component Value Date    TRIG 129 05/31/2024     Lab Results   Component Value Date    LDLCALC 126 (H) 05/31/2024     No results found for: \"VITD\"  No components found for: \"FASTINS\"   "

## 2024-08-28 NOTE — ASSESSMENT & PLAN NOTE
Patient reports that he follows a ketogenic diet sometimes skips both his breakfast and lunch meals as he is in meetings all day.  He eats 1-2 meals a day but mostly one with protein and sometimes includes vegetables.  He later snacks into the night on meat and cheese prior  to bed late into the night until 2 AM.  I discussed evenly distributing his calories and shifting his eating towards day, meal prepping and keeping food on hand such as even meal replacement shakes or calorie and portion controlled dinners.  Patient was initially reluctant to meet with the dietitian. Patient's insurance has changed and he would like to initiate injectable medication.  I also informed him that even with the injectable medication which has its limits and may not be sufficient to get him to his weight loss goals.  Patient verbalized understanding but declined bariatric surgery as this was presented as a durable weight loss option.  I discussed the importance of nourishing has body appropriately while on injectable medication as the patient.  Did discuss for the patient to incorporate more vegetables along with protein and choosing healthy fats and work with the dietitian to balance his nutrition.   Instructed him to meet with the dietitian and initiate nutritional plan prior to initiating Zepbound ,prescription for which has been submitted. Metformin can be considered in combination therapy as his labs indicate insulin resistance fasting insulin of 55.- Did inform the patient that weight loss medications would likely need to be continued lifelong.  Advised him to not skip meals as it perpetuates the cycle of his consuming most of his calories towards the end of the day along with nighttime eating.  Discussed with patient that the bodies protein needs cannot be met with 1 meal a day;  encouraged the patient to consider making an appointment with the bariatric surgery team for consultation.  Patient declines at this time.  Other  medication options  -Topiramate could be considered for night eating   -Counseled the patient about metabolic dysfunction that can come about due to night eating due to circadian disruption which is evident by elevated fasting insulin.  /100.  Would avoid phentermine and Qsymia.  Patient has not been moving his bedtime back to prevent night eating as previously discussed at initial consult in February.  Patient's activity is also limited due to physical limitations from obesity.  Would generally advise decreasing sedentary behavior is much as possible and to consider a recumbent bike chair yoga resistance bands for strength training

## 2024-08-30 ENCOUNTER — TELEPHONE (OUTPATIENT)
Dept: BARIATRICS | Facility: CLINIC | Age: 54
End: 2024-08-30

## 2024-08-30 NOTE — TELEPHONE ENCOUNTER
[facility] JERNIGAN DLINDY (Key: [id number]) - [id number]  Zepbound 2.5MG/0.5ML pen-injectors  status: PA RequestCreated: August 27th, 2024 [phone]: August 30th, 2024

## 2024-10-02 DIAGNOSIS — E66.813 CLASS 3 DRUG-INDUCED OBESITY WITH SERIOUS COMORBIDITY AND BODY MASS INDEX (BMI) OF 60.0 TO 69.9 IN ADULT (HCC): Primary | ICD-10-CM

## 2024-10-02 DIAGNOSIS — E66.1 CLASS 3 DRUG-INDUCED OBESITY WITH SERIOUS COMORBIDITY AND BODY MASS INDEX (BMI) OF 60.0 TO 69.9 IN ADULT (HCC): Primary | ICD-10-CM

## 2024-10-02 RX ORDER — TIRZEPATIDE 5 MG/.5ML
5 INJECTION, SOLUTION SUBCUTANEOUS WEEKLY
Qty: 2 ML | Refills: 1 | Status: SHIPPED | OUTPATIENT
Start: 2024-10-02 | End: 2024-10-04 | Stop reason: SDUPTHER

## 2024-10-04 RX ORDER — TIRZEPATIDE 5 MG/.5ML
5 INJECTION, SOLUTION SUBCUTANEOUS WEEKLY
Qty: 2 ML | Refills: 0 | Status: SHIPPED | OUTPATIENT
Start: 2024-10-04 | End: 2024-11-29

## 2024-10-28 DIAGNOSIS — E66.01 CLASS 3 SEVERE OBESITY DUE TO EXCESS CALORIES WITH BODY MASS INDEX (BMI) OF 60.0 TO 69.9 IN ADULT, UNSPECIFIED WHETHER SERIOUS COMORBIDITY PRESENT (HCC): Primary | ICD-10-CM

## 2024-10-28 DIAGNOSIS — E66.813 CLASS 3 SEVERE OBESITY DUE TO EXCESS CALORIES WITH BODY MASS INDEX (BMI) OF 60.0 TO 69.9 IN ADULT, UNSPECIFIED WHETHER SERIOUS COMORBIDITY PRESENT (HCC): Primary | ICD-10-CM

## 2024-10-28 RX ORDER — TIRZEPATIDE 7.5 MG/.5ML
7.5 INJECTION, SOLUTION SUBCUTANEOUS WEEKLY
Qty: 2 ML | Refills: 1 | Status: SHIPPED | OUTPATIENT
Start: 2024-10-28 | End: 2024-12-23

## 2024-11-24 NOTE — PROGRESS NOTES
Program: [facility]    Assessment/Plan     Glenn Armenta  is a 54 y.o. male with  returns to follow up  for treatment of excess body weight and associated risk factors/co-morbidities.     Class 3 severe obesity due to excess calories with body mass index (BMI) of 60.0 to 69.9 in adult (HCC)  Patient reports that he is doing well on his current dose of 7.5 mg of Zepbound and is consistently losing 1 to 2 pounds a week.  The food noise has completely disappeared.  He has cut out starch.  He has able to incorporate smaller portions he notices that towards the end of the week the injection wears off.  He still eats 2 meals at 5 PM and 8 PM.  I have encouraged him to meet with a dietitian to evenly distribute his calories.  Patient declines at this time.  Patient reports that he eats around 2000 benny a day.  He tries to be consistent with his weights but due to his work schedule has not been able to.  Discussed incorporating shorter bouts such as 5 to 10 minutes 2-3 times a day of either walking or weights.  Patient would like to continue staying at this dose.  We discussed considering titrating at up to 10 mg at follow-up visit for weight plateaus.  BP improved 116/70.  We could consider phentermine or topiramate in combination.  However would think metformin might be the best choice in combination due to insulin resistance and fasting insulin of 55.  No contraindications to Wellbutrin naltrexone.  Did discuss that we will continue titrating up step down to maximum and consider adding oral medication.  At some point we will reassess weight loss response to see if patient is able to get to his weight loss goals.       Most recent notes and records were reviewed.         Return visit:  2-3 months     Nutrition   Do not skip meals. Avoid sugary beverages. At least 64oz of water daily.    Behavioral/Stress   Food log via paulino or provided paper log (paulino options include www.myfitnesspal.com, sparkpeople.com,  "loseit.com, calorieking.com, baritastic). Encouraged mindful eating    Physical Activity  Increase physical activity by 10 minutes daily. Gradually increase physical activity to a goal of 5 days per week for 30 minutes of MODERATE intensity ( ( should be able to pass the \"talk test\" but should not be able to sing.  target 150-300 minutes  PLUS 2-3 days per week of FULL BODY resistance training. Progression will be addressed at follow up visits. Encouraged planning regarding establishing physical activity routine    Sleep  Provided sleep hygiene counseling and importance of having adequate sleep duration          Dirk was seen today for follow-up.    Diagnoses and all orders for this visit:    Class 3 severe obesity due to excess calories with serious comorbidity and body mass index (BMI) of 60.0 to 69.9 in adult (HCC)    Class 3 severe obesity due to excess calories with body mass index (BMI) of 60.0 to 69.9 in adult, unspecified whether serious comorbidity present (HCC)  -     tirzepatide (Zepbound) 7.5 mg/0.5 mL auto-injector; Inject 0.5 mL (7.5 mg total) under the skin once a week                Total time spent reviewing chart, interviewing patient, examining patient, discussing plan, answering all questions, and documentin minutes with >50% face-to-face time with the patient.            Weight trajectory     Wt Readings from Last 20 Encounters:   24 (!) 189 kg (417 lb 3.2 oz)   24 (!) 197 kg (433 lb 9.6 oz)   24 (!) 198 kg (436 lb)   24 (!) 196 kg (431 lb)   23 (!) 192 kg (424 lb)   10/04/22 (!) 195 kg (429 lb 3.2 oz)   10/05/21 (!) 188 kg (414 lb)   21 (!) 185 kg (407 lb)   21 (!) 190 kg (418 lb)   21 (!) 184 kg (405 lb)   21 (!) 190 kg (419 lb 12.8 oz)   20 (!) 184 kg (405 lb)   20 (!) 184 kg (405 lb)   19 (!) 184 kg (405 lb)   18 (!) 181 kg (400 lb)   18 (!) 184 kg (405 lb)   18 (!) 184 kg (405 lb)   17 (!) " "189 kg (416 lb 15.9 oz)   08/24/17 (!) 201 kg (442 lb 15.9 oz)   02/15/17 (!) 202 kg (444 lb 15.9 oz)               Lifestyle questionnaire     Patient reports     Diet recall:  B: skip  S:skips  L: 5 PM pastas 5 oz meat   S:skips  D: meat and eggs  broccoli 8 PM  S: swiss cheese  Water-- 2-3 lts    Caffeine/tea--none     SSB- diet soda if he goes out     Alcohol: never  Smoking: never  Drug use: never     Physical Activity -- none currently - has treadmill, weights, exercise bike, knee issues, wont go to the gym to  leaking out urine as he is status post prostatectomy due to prostrate cancer  Sleep --ARIEL on CPAP     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, 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 sites a couple times a week  [employer]o social-lives with wife Nery son 21, oldest 28                    Start date: 02/28/24  Current weight : 431 lbs  Current BMI: 61.84 kg/m2  Obesity Class: Above 40- Obesity Class III  Goal weight: <300 lbs              Current Weight on 8/27/24 : 433 lbs  Current BMI : 62.2 kg/m2 Above 40- Obesity Class III  Difference: +2 lbs     Weight on 11/26/24: 417 lbs(-16)  BMI on 11/26/24: 59.8 kg/m2 Above 40- Obesity Class III  Difference: -16 lbs     Anti obesity Medications/ Medical---     Weight loss medication and dose: Zepbound   Date initiated: August 2024                    Objective         The following portions of the patient's history were reviewed and updated as appropriate: allergies, current medications, past family history, past medical history, past social history, past surgical history, and problem list.      /70 (BP Location: Left arm, Patient Position: Sitting, Cuff Size: Large)   Pulse 93   Ht 5' 10\" (1.778 m)   Wt (!) 189 kg (417 lb 3.2 oz)   BMI 59.86 kg/m²             Review of Systems   Constitutional:  Negative for fatigue.   HENT:  Negative for sore throat.    Respiratory:  Negative for cough and shortness of breath.    Cardiovascular:  Negative for chest pain, " palpitations and leg swelling.   Gastrointestinal:  Negative for abdominal pain, constipation, diarrhea and nausea.   Genitourinary:  Negative for dysuria.   Musculoskeletal:  Negative for arthralgias and back pain.   Skin:  Negative for rash.   Neurological:  Negative for headaches.   Psychiatric/Behavioral:  Negative for dysphoric mood. The patient is not nervous/anxious.          Physical Exam  Vitals and nursing note reviewed.   Constitutional:       Appearance: Normal appearance.   HENT:      Head: Normocephalic.   Pulmonary:      Effort: Pulmonary effort is normal.   Neurological:      General: No focal deficit present.      Mental Status: She is alert and oriented to person, place, and time.   Psychiatric:         Mood and Affect: Mood normal.         Behavior: Behavior normal.         Thought Content: Thought content normal.         Judgment: Judgment normal.          Current medications       Current Outpatient Medications:     allopurinol (ZYLOPRIM) 100 mg tablet, Take 1 tablet (100 mg total) by mouth daily, Disp: 100 tablet, Rfl: 1    APPLE CIDER VINEGAR PO, Take by mouth, Disp: , Rfl:     diclofenac sodium (VOLTAREN) 1 %, Apply 4 g topically 4 (four) times a day for 90 days, Disp: 1440 g, Rfl: 0    meloxicam (MOBIC) 15 mg tablet, Take 1 tablet (15 mg total) by mouth daily, Disp: 100 tablet, Rfl: 1    olmesartan (BENICAR) 40 mg tablet, Take 1 tablet (40 mg total) by mouth daily, Disp: 100 tablet, Rfl: 1    tirzepatide (Zepbound) 7.5 mg/0.5 mL auto-injector, Inject 0.5 mL (7.5 mg total) under the skin once a week, Disp: 2 mL, Rfl: 1    colchicine (COLCRYS) 0.6 mg tablet, Take 1 tablet (0.6 mg total) by mouth daily (Patient not taking: Reported on 11/26/2024), Disp: 30 tablet, Rfl: 2         Medication considerations/contraindications     -Patient denies personal history of pancreatitis. Patient also denies personal and family history of medullary thyroid cancer, and multiple endocrine neoplasia type 2 (MEN  "2 tumor). -Patient denies any history of kidney stones, seizures, or glaucoma, diabetic retinopathy, gall bladder disease, gastroparesis, hyperthyroidism.  -Denies Hx of CAD, PAD, palpitations, arrhythmia, uncontrolled hypertension  -Denies uncontrolled anxiety or depression, suicidal behavior or thinking , insomnia or sleep disturbance.         Labs     Most recent labs reviewed   Lab Results   Component Value Date     01/19/2018    SODIUM 140 10/20/2021    K 4.4 10/20/2021     10/20/2021    CO2 21 10/20/2021    AGAP 10 09/16/2018    BUN 18 10/20/2021    CREATININE 0.95 10/20/2021    GLUC 111 (H) 10/20/2021    CALCIUM 9.2 09/16/2018    AST 14 10/20/2021    ALT 22 10/20/2021    ALKPHOS 79 09/16/2018    PROT 7.6 01/19/2018    TP 7.6 10/20/2021    BILITOT 0.3 01/19/2018    TBILI 0.4 10/20/2021    EGFR 91 09/16/2018     Lab Results   Component Value Date    HGBA1C 5.7 (H) 12/22/2020     Lab Results   Component Value Date    MWG6YCQWXNFK 0.998 12/04/2017    TSH 1.230 05/31/2024     Lab Results   Component Value Date    CHOLESTEROL 204 (H) 05/31/2024     Lab Results   Component Value Date    HDL 55 05/31/2024     Lab Results   Component Value Date    TRIG 129 05/31/2024     Lab Results   Component Value Date    LDLCALC 126 (H) 05/31/2024     No results found for: \"VITD\"  No components found for: \"FASTINS\"   "

## 2024-11-26 ENCOUNTER — OFFICE VISIT (OUTPATIENT)
Dept: BARIATRICS | Facility: CLINIC | Age: 54
End: 2024-11-26
Payer: COMMERCIAL

## 2024-11-26 VITALS
WEIGHT: 315 LBS | DIASTOLIC BLOOD PRESSURE: 70 MMHG | HEART RATE: 93 BPM | HEIGHT: 70 IN | SYSTOLIC BLOOD PRESSURE: 116 MMHG | BODY MASS INDEX: 45.1 KG/M2

## 2024-11-26 DIAGNOSIS — E66.01 CLASS 3 SEVERE OBESITY DUE TO EXCESS CALORIES WITH BODY MASS INDEX (BMI) OF 60.0 TO 69.9 IN ADULT, UNSPECIFIED WHETHER SERIOUS COMORBIDITY PRESENT (HCC): ICD-10-CM

## 2024-11-26 DIAGNOSIS — E66.813 CLASS 3 SEVERE OBESITY DUE TO EXCESS CALORIES WITH SERIOUS COMORBIDITY AND BODY MASS INDEX (BMI) OF 60.0 TO 69.9 IN ADULT (HCC): Primary | ICD-10-CM

## 2024-11-26 DIAGNOSIS — E66.01 CLASS 3 SEVERE OBESITY DUE TO EXCESS CALORIES WITH SERIOUS COMORBIDITY AND BODY MASS INDEX (BMI) OF 60.0 TO 69.9 IN ADULT (HCC): Primary | ICD-10-CM

## 2024-11-26 DIAGNOSIS — E66.813 CLASS 3 SEVERE OBESITY DUE TO EXCESS CALORIES WITH BODY MASS INDEX (BMI) OF 60.0 TO 69.9 IN ADULT, UNSPECIFIED WHETHER SERIOUS COMORBIDITY PRESENT (HCC): ICD-10-CM

## 2024-11-26 PROCEDURE — 99215 OFFICE O/P EST HI 40 MIN: CPT | Performed by: INTERNAL MEDICINE

## 2024-11-26 RX ORDER — TIRZEPATIDE 7.5 MG/.5ML
7.5 INJECTION, SOLUTION SUBCUTANEOUS WEEKLY
Qty: 2 ML | Refills: 1 | Status: SHIPPED | OUTPATIENT
Start: 2024-11-26 | End: 2025-01-21

## 2024-11-26 NOTE — ASSESSMENT & PLAN NOTE
Patient reports that he is doing well on his current dose of 7.5 mg of Zepbound and is consistently losing 1 to 2 pounds a week.  The food noise has completely disappeared.  He has cut out starch.  He has able to incorporate smaller portions he notices that towards the end of the week the injection wears off.  He still eats 2 meals at 5 PM and 8 PM.  I have encouraged him to meet with a dietitian to evenly distribute his calories.  Patient declines at this time.  Patient reports that he eats around 2000 benny a day.  He tries to be consistent with his weights but due to his work schedule has not been able to.  Discussed incorporating shorter bouts such as 5 to 10 minutes 2-3 times a day of either walking or weights.  Patient would like to continue staying at this dose.  We discussed considering titrating at up to 10 mg at follow-up visit for weight plateaus.  BP improved 116/70.  We could consider phentermine or topiramate in combination.  However would think metformin might be the best choice in combination due to insulin resistance and fasting insulin of 55.  No contraindications to Wellbutrin naltrexone.  Did discuss that we will continue titrating up step down to maximum and consider adding oral medication.  At some point we will reassess weight loss response to see if patient is able to get to his weight loss goals.

## 2025-01-08 DIAGNOSIS — E66.813 CLASS 3 SEVERE OBESITY DUE TO EXCESS CALORIES WITH SERIOUS COMORBIDITY AND BODY MASS INDEX (BMI) OF 60.0 TO 69.9 IN ADULT (HCC): Primary | ICD-10-CM

## 2025-01-08 DIAGNOSIS — E66.01 CLASS 3 SEVERE OBESITY DUE TO EXCESS CALORIES WITH SERIOUS COMORBIDITY AND BODY MASS INDEX (BMI) OF 60.0 TO 69.9 IN ADULT (HCC): Primary | ICD-10-CM

## 2025-01-08 RX ORDER — TIRZEPATIDE 10 MG/.5ML
10 INJECTION, SOLUTION SUBCUTANEOUS WEEKLY
Qty: 2 ML | Refills: 2 | Status: SHIPPED | OUTPATIENT
Start: 2025-01-08 | End: 2025-04-02

## 2025-01-23 DIAGNOSIS — I10 BENIGN ESSENTIAL HYPERTENSION: ICD-10-CM

## 2025-01-23 DIAGNOSIS — M10.9 GOUT, UNSPECIFIED CAUSE, UNSPECIFIED CHRONICITY, UNSPECIFIED SITE: ICD-10-CM

## 2025-01-23 RX ORDER — ALLOPURINOL 100 MG/1
100 TABLET ORAL DAILY
Qty: 90 TABLET | Refills: 0 | Status: SHIPPED | OUTPATIENT
Start: 2025-01-23

## 2025-01-23 RX ORDER — OLMESARTAN MEDOXOMIL 40 MG/1
40 TABLET ORAL DAILY
Qty: 90 TABLET | Refills: 0 | Status: SHIPPED | OUTPATIENT
Start: 2025-01-23

## 2025-02-12 DIAGNOSIS — E66.813 CLASS 3 SEVERE OBESITY DUE TO EXCESS CALORIES WITH SERIOUS COMORBIDITY AND BODY MASS INDEX (BMI) OF 60.0 TO 69.9 IN ADULT (HCC): ICD-10-CM

## 2025-02-12 DIAGNOSIS — E66.01 CLASS 3 SEVERE OBESITY DUE TO EXCESS CALORIES WITH SERIOUS COMORBIDITY AND BODY MASS INDEX (BMI) OF 60.0 TO 69.9 IN ADULT (HCC): ICD-10-CM

## 2025-02-13 RX ORDER — TIRZEPATIDE 10 MG/.5ML
10 INJECTION, SOLUTION SUBCUTANEOUS WEEKLY
Qty: 2 ML | Refills: 0 | OUTPATIENT
Start: 2025-02-13 | End: 2025-05-08

## 2025-03-03 ENCOUNTER — OFFICE VISIT (OUTPATIENT)
Dept: BARIATRICS | Facility: CLINIC | Age: 55
End: 2025-03-03
Payer: COMMERCIAL

## 2025-03-03 VITALS
SYSTOLIC BLOOD PRESSURE: 136 MMHG | WEIGHT: 315 LBS | DIASTOLIC BLOOD PRESSURE: 70 MMHG | OXYGEN SATURATION: 97 % | HEIGHT: 70 IN | BODY MASS INDEX: 45.1 KG/M2 | HEART RATE: 78 BPM

## 2025-03-03 DIAGNOSIS — E03.9 HYPOTHYROIDISM, UNSPECIFIED TYPE: ICD-10-CM

## 2025-03-03 DIAGNOSIS — R73.03 PRE-DIABETES: ICD-10-CM

## 2025-03-03 DIAGNOSIS — E55.9 VITAMIN D DEFICIENCY: ICD-10-CM

## 2025-03-03 DIAGNOSIS — E66.813 CLASS 3 SEVERE OBESITY DUE TO EXCESS CALORIES WITH SERIOUS COMORBIDITY AND BODY MASS INDEX (BMI) OF 50.0 TO 59.9 IN ADULT (HCC): Primary | ICD-10-CM

## 2025-03-03 DIAGNOSIS — E78.5 HYPERLIPIDEMIA, UNSPECIFIED HYPERLIPIDEMIA TYPE: ICD-10-CM

## 2025-03-03 DIAGNOSIS — G47.33 OBSTRUCTIVE SLEEP APNEA: ICD-10-CM

## 2025-03-03 DIAGNOSIS — E66.01 CLASS 3 SEVERE OBESITY DUE TO EXCESS CALORIES WITH SERIOUS COMORBIDITY AND BODY MASS INDEX (BMI) OF 50.0 TO 59.9 IN ADULT (HCC): Primary | ICD-10-CM

## 2025-03-03 PROCEDURE — 99215 OFFICE O/P EST HI 40 MIN: CPT | Performed by: INTERNAL MEDICINE

## 2025-03-03 RX ORDER — TIRZEPATIDE 12.5 MG/.5ML
12.5 INJECTION, SOLUTION SUBCUTANEOUS WEEKLY
Qty: 2 ML | Refills: 0 | Status: SHIPPED | OUTPATIENT
Start: 2025-03-03 | End: 2025-03-31

## 2025-03-03 NOTE — ASSESSMENT & PLAN NOTE
Antiobesity Medications/Medical --  On Zepbound 10 mg -patient reports some return of hunger and cravings.  Will increase the dose to 12.5 mg  Patient has lost 23 pounds total-5.3% which is suboptimal  Patient does not want to consider bariatric surgery at this time   However would think metformin might be the best choice in combination due to insulin resistance and fasting insulin of 55.  This option was presented and patient declined at this time so we will only titrate the Zepbound up to 12.5 mg  No contraindications to Wellbutrin naltrexone or topiramate in combination  Phentermine would be last option as patient has some circadian disruption        Nutrition:    Advised patient to not skip meals as to avoid slowing down metabolic rate.  Also discussed with patient to avoid late night eating  Patient reports that he cannot function at work if he eats.  Suggested adding a protein shake and patient was not open to that at this point  Declined meeting with a dietitian again  Emphasized the importance of adequate protein and meal timing.  Discussed with patient that his current timing of eating promote metabolic disease and weight gain    Physical Activity:   Sometimes does weights at home and plays the drums.  Waiting for weather to get form so he can walk weights   Encouraged some progression with weights at home    Sleep: -  Circadian disruption with sleeping late and some night eating  Severe obstructive sleep apnea on CPAP sleeps from 2 AM to 8:30 AM;     Food Behaviors/Stress:   Patient reports some  cravings are returning  He reports that he is at the lowest point that he got down by himself using keto and he would like to break the threshold of 400 pounds

## 2025-03-03 NOTE — PROGRESS NOTES
Program: [facility]    Assessment/Plan     Glenn Armenta  is a 54 y.o. male with  returns to follow up  for treatment of excess body weight and associated risk factors/co-morbidities.     Class 3 severe obesity due to excess calories with body mass index (BMI) of 50.0 to 59.9 in adult (HCC)  Antiobesity Medications/Medical --  On Zepbound 10 mg -patient reports some return of hunger and cravings.  Will increase the dose to 12.5 mg  Patient has lost 23 pounds total-5.3% which is suboptimal  Patient does not want to consider bariatric surgery at this time   However would think metformin might be the best choice in combination due to insulin resistance and fasting insulin of 55.  This option was presented and patient declined at this time so we will only titrate the Zepbound up to 12.5 mg  No contraindications to Wellbutrin naltrexone or topiramate in combination  Phentermine would be last option as patient has some circadian disruption        Nutrition:    Advised patient to not skip meals as to avoid slowing down metabolic rate.  Also discussed with patient to avoid late night eating  Patient reports that he cannot function at work if he eats.  Suggested adding a protein shake and patient was not open to that at this point  Declined meeting with a dietitian again  Emphasized the importance of adequate protein and meal timing.  Discussed with patient that his current timing of eating promote metabolic disease and weight gain    Physical Activity:   Sometimes does weights at home and plays the drums.  Waiting for weather to get form so he can walk weights   Encouraged some progression with weights at home    Sleep: -  Circadian disruption with sleeping late and some night eating  Severe obstructive sleep apnea on CPAP sleeps from 2 AM to 8:30 AM;     Food Behaviors/Stress:   Patient reports some  cravings are returning  He reports that he is at the lowest point that he got down by himself using keto and he  "would like to break the threshold of 400 pounds              Most recent notes and records were reviewed.         Return visit:  2-3 months     Nutrition   Do not skip meals. Avoid sugary beverages. At least 64oz of water daily.    Behavioral/Stress   Food log via paulino or provided paper log (paulino options include www.[email or web address], sparkpeople.com, loseit.com, calorieking.com, [employer]). Encouraged mindful eating    Physical Activity  Increase physical activity by 10 minutes daily. Gradually increase physical activity to a goal of 5 days per week for 30 minutes of MODERATE intensity ( ( should be able to pass the \"talk test\" but should not be able to sing.  target 150-300 minutes  PLUS 2-3 days per week of FULL BODY resistance training. Progression will be addressed at follow up visits. Encouraged planning regarding establishing physical activity routine    Sleep  Provided sleep hygiene counseling and importance of having adequate sleep duration          Dirk was seen today for follow-up.    Diagnoses and all orders for this visit:    Class 3 severe obesity due to excess calories with serious comorbidity and body mass index (BMI) of 50.0 to 59.9 in adult (HCC)  -     CBC and differential; Future  -     Comprehensive metabolic panel; Future  -     tirzepatide (Zepbound) 12.5 mg/0.5 mL auto-injector; Inject 0.5 mL (12.5 mg total) under the skin once a week for 28 days  -     CBC and differential  -     Comprehensive metabolic panel    Pre-diabetes  -     Hemoglobin A1C; Future  -     Insulin, fasting; Future  -     Hemoglobin A1C    Vitamin D deficiency  -     Vitamin D 25 hydroxy; Future  -     Vitamin D 25 hydroxy    Hypothyroidism, unspecified type  -     TSH, 3rd generation with Free T4 reflex; Future  -     T3, free; Future  -     TSH, 3rd generation with Free T4 reflex  -     T3, free    Hyperlipidemia, unspecified hyperlipidemia type  -     Lipid panel; Future  -     Lipid panel    Obstructive sleep apnea  -  "    tirzepatide (Zepbound) 12.5 mg/0.5 mL auto-injector; Inject 0.5 mL (12.5 mg total) under the skin once a week for 28 days                  Total time spent reviewing chart, interviewing patient, examining patient, discussing plan, answering all questions, and documentin minutes with >50% face-to-face time with the patient.            Weight trajectory     Wt Readings from Last 20 Encounters:   25 (!) 186 kg (410 lb 3.2 oz)   24 (!) 189 kg (417 lb 3.2 oz)   24 (!) 197 kg (433 lb 9.6 oz)   24 (!) 198 kg (436 lb)   24 (!) 196 kg (431 lb)   23 (!) 192 kg (424 lb)   10/04/22 (!) 195 kg (429 lb 3.2 oz)   10/05/21 (!) 188 kg (414 lb)   21 (!) 185 kg (407 lb)   21 (!) 190 kg (418 lb)   21 (!) 184 kg (405 lb)   21 (!) 190 kg (419 lb 12.8 oz)   20 (!) 184 kg (405 lb)   20 (!) 184 kg (405 lb)   19 (!) 184 kg (405 lb)   18 (!) 181 kg (400 lb)   18 (!) 184 kg (405 lb)   18 (!) 184 kg (405 lb)   17 (!) 189 kg (416 lb 15.9 oz)   17 (!) 201 kg (442 lb 15.9 oz)               Lifestyle questionnaire     Patient reports     Diet recall:  B: skip  S:skips  L: 5 PM pastas 5 oz meat   S:skips  D: meat and eggs  broccoli 8 PM  S: swiss cheese or leftover chicken rice   Water-- 2-3 lts    Caffeine/tea--none     SSB- diet soda if he goes out     Alcohol: never  Smoking: never  Drug use: never     Physical Activity -- none currently - has treadmill, weights, exercise bike, knee issues, wont go to the gym to  leaking out urine as he is status post prostatectomy due to prostrate cancer  Sleep --ARIEL on CPAP     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, 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 Above 40- Obesity Class III  Difference: -23  lbs         Anti obesity Medications/ Medical---     Weight loss medication and dose: Zepbound   Date initiated: August 2024                    Objective         The following portions of the patient's history were reviewed and updated as appropriate: allergies, current medications, past family history, past medical history, past social history, past surgical history, and problem list.      /70   Pulse 78   Ht 5' 10\" (1.778 m)   Wt (!) 186 kg (410 lb 3.2 oz)   SpO2 97%   BMI 58.86 kg/m²             Review of Systems   Constitutional:  Negative for fatigue.   HENT:  Negative for sore throat.    Respiratory:  Negative for cough and shortness of breath.    Cardiovascular:  Negative for chest pain, palpitations and leg swelling.   Gastrointestinal:  Negative for abdominal pain, constipation, diarrhea and nausea.   Genitourinary:  Negative for dysuria.   Musculoskeletal:  Negative for arthralgias and back pain.   Skin:  Negative for rash.   Neurological:  Negative for headaches.   Psychiatric/Behavioral:  Negative for dysphoric mood. The patient is not nervous/anxious.          Physical Exam  Vitals and nursing note reviewed.   Constitutional:       Appearance: Normal appearance.   HENT:      Head: Normocephalic.   Pulmonary:      Effort: Pulmonary effort is normal.   Neurological:      General: No focal deficit present.      Mental Status: She is alert and oriented to person, place, and time.   Psychiatric:         Mood and Affect: Mood normal.         Behavior: Behavior normal.         Thought Content: Thought content normal.         " Judgment: Judgment normal.          Current medications       Current Outpatient Medications:     allopurinol (ZYLOPRIM) 100 mg tablet, Take 1 tablet by mouth daily., Disp: 90 tablet, Rfl: 0    APPLE CIDER VINEGAR PO, Take by mouth, Disp: , Rfl:     diclofenac sodium (VOLTAREN) 1 %, Apply 4 g topically 4 (four) times a day for 90 days, Disp: 1440 g, Rfl: 0    meloxicam (MOBIC) 15 mg tablet, Take 1 tablet (15 mg total) by mouth daily, Disp: 100 tablet, Rfl: 1    olmesartan (BENICAR) 40 mg tablet, Take 1 tablet by mouth daily., Disp: 90 tablet, Rfl: 0    tirzepatide (Zepbound) 12.5 mg/0.5 mL auto-injector, Inject 0.5 mL (12.5 mg total) under the skin once a week for 28 days, Disp: 2 mL, Rfl: 0    colchicine (COLCRYS) 0.6 mg tablet, Take 1 tablet (0.6 mg total) by mouth daily (Patient not taking: Reported on 3/3/2025), Disp: 30 tablet, Rfl: 2         Medication considerations/contraindications     -Patient denies personal history of pancreatitis. Patient also denies personal and family history of medullary thyroid cancer, and multiple endocrine neoplasia type 2 (MEN 2 tumor). -Patient denies any history of kidney stones, seizures, or glaucoma, diabetic retinopathy, gall bladder disease, gastroparesis, hyperthyroidism.  -Denies Hx of CAD, PAD, palpitations, arrhythmia, uncontrolled hypertension  -Denies uncontrolled anxiety or depression, suicidal behavior or thinking , insomnia or sleep disturbance.         Labs     Most recent labs reviewed   Lab Results   Component Value Date     01/19/2018    SODIUM 140 10/20/2021    K 4.4 10/20/2021     10/20/2021    CO2 21 10/20/2021    AGAP 10 09/16/2018    BUN 18 10/20/2021    CREATININE 0.95 10/20/2021    GLUC 111 (H) 10/20/2021    CALCIUM 9.2 09/16/2018    AST 14 10/20/2021    ALT 22 10/20/2021    ALKPHOS 79 09/16/2018    PROT 7.6 01/19/2018    TP 7.6 10/20/2021    BILITOT 0.3 01/19/2018    TBILI 0.4 10/20/2021    EGFR 91 09/16/2018     Lab Results   Component  "Value Date    HGBA1C 5.7 (H) 12/22/2020     Lab Results   Component Value Date    HYV3QKIROWAF 0.998 12/04/2017    TSH 1.230 05/31/2024     Lab Results   Component Value Date    CHOLESTEROL 204 (H) 05/31/2024     Lab Results   Component Value Date    HDL 55 05/31/2024     Lab Results   Component Value Date    TRIG 129 05/31/2024     Lab Results   Component Value Date    LDLCALC 126 (H) 05/31/2024     No results found for: \"VITD\"  No components found for: \"FASTINS\"   "

## 2025-04-03 ENCOUNTER — RESULTS FOLLOW-UP (OUTPATIENT)
Dept: BARIATRICS | Facility: CLINIC | Age: 55
End: 2025-04-03

## 2025-04-03 LAB
25(OH)D3+25(OH)D2 SERPL-MCNC: 102 NG/ML (ref 30–100)
ALBUMIN SERPL-MCNC: 4.6 G/DL (ref 3.8–4.9)
ALP SERPL-CCNC: 95 IU/L (ref 44–121)
ALT SERPL-CCNC: 24 IU/L (ref 0–44)
AST SERPL-CCNC: 17 IU/L (ref 0–40)
BASOPHILS # BLD AUTO: 0.1 X10E3/UL (ref 0–0.2)
BASOPHILS NFR BLD AUTO: 1 %
BILIRUB SERPL-MCNC: 0.3 MG/DL (ref 0–1.2)
BUN SERPL-MCNC: 17 MG/DL (ref 6–24)
BUN/CREAT SERPL: 19 (ref 9–20)
CALCIUM SERPL-MCNC: 9.8 MG/DL (ref 8.7–10.2)
CHLORIDE SERPL-SCNC: 107 MMOL/L (ref 96–106)
CHOLEST SERPL-MCNC: 173 MG/DL (ref 100–199)
CHOLEST/HDLC SERPL: 3.6 RATIO (ref 0–5)
CO2 SERPL-SCNC: 21 MMOL/L (ref 20–29)
CREAT SERPL-MCNC: 0.91 MG/DL (ref 0.76–1.27)
EGFR: 100 ML/MIN/1.73
EOSINOPHIL # BLD AUTO: 0.2 X10E3/UL (ref 0–0.4)
EOSINOPHIL NFR BLD AUTO: 3 %
ERYTHROCYTE [DISTWIDTH] IN BLOOD BY AUTOMATED COUNT: 12.4 % (ref 11.6–15.4)
EST. AVERAGE GLUCOSE BLD GHB EST-MCNC: 120 MG/DL
GLOBULIN SER-MCNC: 2.4 G/DL (ref 1.5–4.5)
GLUCOSE SERPL-MCNC: 113 MG/DL (ref 70–99)
HBA1C MFR BLD: 5.8 % (ref 4.8–5.6)
HCT VFR BLD AUTO: 43.6 % (ref 37.5–51)
HDLC SERPL-MCNC: 48 MG/DL
HGB BLD-MCNC: 15 G/DL (ref 13–17.7)
IMM GRANULOCYTES # BLD: 0.1 X10E3/UL (ref 0–0.1)
IMM GRANULOCYTES NFR BLD: 1 %
LDLC SERPL CALC-MCNC: 109 MG/DL (ref 0–99)
LYMPHOCYTES # BLD AUTO: 1.5 X10E3/UL (ref 0.7–3.1)
LYMPHOCYTES NFR BLD AUTO: 18 %
MCH RBC QN AUTO: 31.6 PG (ref 26.6–33)
MCHC RBC AUTO-ENTMCNC: 34.4 G/DL (ref 31.5–35.7)
MCV RBC AUTO: 92 FL (ref 79–97)
MONOCYTES # BLD AUTO: 0.6 X10E3/UL (ref 0.1–0.9)
MONOCYTES NFR BLD AUTO: 7 %
NEUTROPHILS # BLD AUTO: 5.9 X10E3/UL (ref 1.4–7)
NEUTROPHILS NFR BLD AUTO: 70 %
PLATELET # BLD AUTO: 285 X10E3/UL (ref 150–450)
POTASSIUM SERPL-SCNC: 4.7 MMOL/L (ref 3.5–5.2)
PROT SERPL-MCNC: 7 G/DL (ref 6–8.5)
RBC # BLD AUTO: 4.75 X10E6/UL (ref 4.14–5.8)
SL AMB VLDL CHOLESTEROL CALC: 16 MG/DL (ref 5–40)
SODIUM SERPL-SCNC: 143 MMOL/L (ref 134–144)
T3FREE SERPL-MCNC: 3.5 PG/ML (ref 2–4.4)
TRIGL SERPL-MCNC: 84 MG/DL (ref 0–149)
TSH SERPL DL<=0.005 MIU/L-ACNC: 0.59 UIU/ML (ref 0.45–4.5)
WBC # BLD AUTO: 8.5 X10E3/UL (ref 3.4–10.8)

## 2025-04-08 RX ORDER — TIRZEPATIDE 12.5 MG/.5ML
12.5 INJECTION, SOLUTION SUBCUTANEOUS WEEKLY
Qty: 2 ML | Refills: 0 | Status: CANCELLED | OUTPATIENT
Start: 2025-04-08 | End: 2025-05-06

## 2025-04-21 DIAGNOSIS — E66.813 CLASS 3 SEVERE OBESITY DUE TO EXCESS CALORIES WITH SERIOUS COMORBIDITY AND BODY MASS INDEX (BMI) OF 50.0 TO 59.9 IN ADULT: Primary | ICD-10-CM

## 2025-04-21 RX ORDER — TIRZEPATIDE 12.5 MG/.5ML
12.5 INJECTION, SOLUTION SUBCUTANEOUS WEEKLY
Qty: 2 ML | Refills: 0 | Status: SHIPPED | OUTPATIENT
Start: 2025-04-21 | End: 2025-05-19

## 2025-05-14 DIAGNOSIS — I10 BENIGN ESSENTIAL HYPERTENSION: ICD-10-CM

## 2025-05-14 DIAGNOSIS — M10.9 GOUT, UNSPECIFIED CAUSE, UNSPECIFIED CHRONICITY, UNSPECIFIED SITE: ICD-10-CM

## 2025-05-14 DIAGNOSIS — E66.813 CLASS 3 SEVERE OBESITY DUE TO EXCESS CALORIES WITH SERIOUS COMORBIDITY AND BODY MASS INDEX (BMI) OF 50.0 TO 59.9 IN ADULT: Primary | ICD-10-CM

## 2025-05-14 RX ORDER — OLMESARTAN MEDOXOMIL 40 MG/1
40 TABLET ORAL DAILY
Qty: 90 TABLET | Refills: 0 | Status: SHIPPED | OUTPATIENT
Start: 2025-05-14

## 2025-05-14 RX ORDER — TIRZEPATIDE 10 MG/.5ML
10 INJECTION, SOLUTION SUBCUTANEOUS WEEKLY
Qty: 2 ML | Refills: 2 | Status: SHIPPED | OUTPATIENT
Start: 2025-05-14 | End: 2025-08-06

## 2025-05-15 RX ORDER — ALLOPURINOL 100 MG/1
100 TABLET ORAL DAILY
Qty: 90 TABLET | Refills: 1 | Status: SHIPPED | OUTPATIENT
Start: 2025-05-15

## 2025-07-18 DIAGNOSIS — R10.9 FLANK PAIN: ICD-10-CM

## 2025-07-20 RX ORDER — MELOXICAM 15 MG/1
15 TABLET ORAL DAILY
Qty: 30 TABLET | Refills: 0 | Status: SHIPPED | OUTPATIENT
Start: 2025-07-20

## 2025-08-06 ENCOUNTER — TELEPHONE (OUTPATIENT)
Age: 55
End: 2025-08-06

## 2025-08-07 DIAGNOSIS — E66.813 CLASS 3 SEVERE OBESITY DUE TO EXCESS CALORIES WITH SERIOUS COMORBIDITY AND BODY MASS INDEX (BMI) OF 50.0 TO 59.9 IN ADULT: ICD-10-CM

## 2025-08-07 RX ORDER — TIRZEPATIDE 10 MG/.5ML
10 INJECTION, SOLUTION SUBCUTANEOUS WEEKLY
Qty: 2 ML | Refills: 1 | Status: SHIPPED | OUTPATIENT
Start: 2025-08-07 | End: 2025-10-02